# Patient Record
Sex: FEMALE | Race: WHITE | NOT HISPANIC OR LATINO | Employment: FULL TIME | URBAN - METROPOLITAN AREA
[De-identification: names, ages, dates, MRNs, and addresses within clinical notes are randomized per-mention and may not be internally consistent; named-entity substitution may affect disease eponyms.]

---

## 2019-10-12 ENCOUNTER — HOSPITAL ENCOUNTER (EMERGENCY)
Facility: HOSPITAL | Age: 24
Discharge: HOME | End: 2019-10-12
Attending: EMERGENCY MEDICINE
Payer: COMMERCIAL

## 2019-10-12 ENCOUNTER — APPOINTMENT (EMERGENCY)
Dept: RADIOLOGY | Facility: HOSPITAL | Age: 24
End: 2019-10-12
Payer: COMMERCIAL

## 2019-10-12 VITALS
HEIGHT: 67 IN | DIASTOLIC BLOOD PRESSURE: 78 MMHG | OXYGEN SATURATION: 97 % | SYSTOLIC BLOOD PRESSURE: 135 MMHG | RESPIRATION RATE: 18 BRPM | HEART RATE: 86 BPM | TEMPERATURE: 98.2 F | BODY MASS INDEX: 45.99 KG/M2 | WEIGHT: 293 LBS

## 2019-10-12 DIAGNOSIS — J45.901 ASTHMA EXACERBATION, MILD: ICD-10-CM

## 2019-10-12 DIAGNOSIS — J01.90 ACUTE SINUSITIS: Primary | ICD-10-CM

## 2019-10-12 LAB
EXT PREG TEST URINE: NEGATIVE
EXT. CONTROL ED NAV: NORMAL

## 2019-10-12 PROCEDURE — 99284 EMERGENCY DEPT VISIT MOD MDM: CPT

## 2019-10-12 PROCEDURE — 99285 EMERGENCY DEPT VISIT HI MDM: CPT | Performed by: EMERGENCY MEDICINE

## 2019-10-12 PROCEDURE — 94640 AIRWAY INHALATION TREATMENT: CPT

## 2019-10-12 PROCEDURE — 71046 X-RAY EXAM CHEST 2 VIEWS: CPT

## 2019-10-12 PROCEDURE — 81025 URINE PREGNANCY TEST: CPT | Performed by: EMERGENCY MEDICINE

## 2019-10-12 RX ORDER — ALBUTEROL SULFATE 90 UG/1
1-2 AEROSOL, METERED RESPIRATORY (INHALATION) EVERY 6 HOURS PRN
Qty: 1 INHALER | Refills: 0 | Status: SHIPPED | OUTPATIENT
Start: 2019-10-12

## 2019-10-12 RX ORDER — AMOXICILLIN AND CLAVULANATE POTASSIUM 875; 125 MG/1; MG/1
1 TABLET, FILM COATED ORAL ONCE
Status: COMPLETED | OUTPATIENT
Start: 2019-10-12 | End: 2019-10-12

## 2019-10-12 RX ORDER — AMOXICILLIN AND CLAVULANATE POTASSIUM 875; 125 MG/1; MG/1
1 TABLET, FILM COATED ORAL EVERY 12 HOURS SCHEDULED
Qty: 14 TABLET | Refills: 0 | Status: SHIPPED | OUTPATIENT
Start: 2019-10-12 | End: 2019-10-19

## 2019-10-12 RX ORDER — ALBUTEROL SULFATE 90 UG/1
2 AEROSOL, METERED RESPIRATORY (INHALATION) EVERY 6 HOURS PRN
COMMUNITY

## 2019-10-12 RX ORDER — ALBUTEROL SULFATE 90 UG/1
2 AEROSOL, METERED RESPIRATORY (INHALATION) ONCE
Status: COMPLETED | OUTPATIENT
Start: 2019-10-12 | End: 2019-10-12

## 2019-10-12 RX ORDER — SODIUM CHLORIDE FOR INHALATION 0.9 %
VIAL, NEBULIZER (ML) INHALATION
Status: COMPLETED
Start: 2019-10-12 | End: 2019-10-12

## 2019-10-12 RX ADMIN — ALBUTEROL SULFATE 5 MG: 2.5 SOLUTION RESPIRATORY (INHALATION) at 20:44

## 2019-10-12 RX ADMIN — AMOXICILLIN AND CLAVULANATE POTASSIUM 1 TABLET: 875; 125 TABLET, FILM COATED ORAL at 21:50

## 2019-10-12 RX ADMIN — ALBUTEROL SULFATE 2 PUFF: 90 AEROSOL, METERED RESPIRATORY (INHALATION) at 21:50

## 2019-10-12 RX ADMIN — IPRATROPIUM BROMIDE 0.5 MG: 0.5 SOLUTION RESPIRATORY (INHALATION) at 20:44

## 2019-10-12 RX ADMIN — ISODIUM CHLORIDE 3 ML: 0.03 SOLUTION RESPIRATORY (INHALATION) at 20:44

## 2019-10-13 NOTE — ED PROVIDER NOTES
History  Chief Complaint   Patient presents with    Cough     Pt  was dx with bronchitis last Sunday, patient given steroids but finished yesterday  Patient reports increasing coughing with SOB and CP today (CP while coughing)  Patient reports productive cough with clear and yellow mucous  Denies fevers  30year old female with history of asthma presents for evaluation of cough and shortness of breath worsening over the past two weeks  Cough is productive of yellow sputum  Patient had been diagnosed with bronchitis 1 week ago at an urgent care and was prescribed a course of steroids which she completed  She had been using her albuterol inhaler up to twice daily, but noticed that she was not receiving a full dose with the actuations today and switched to her mother's albuterol inhaler  Despite this, she has had minimal relief in symptoms  Patient reports nasal congestion, facial pressure and post nasal drip as well for the past 2 weeks with onset of sore throat over the past week  She has associated chest tightness and wheezing  Patient denies fevers or chills  History provided by:  Patient  URI   Presenting symptoms: congestion, cough, facial pain and sore throat (anterior, sore)    Presenting symptoms: no fever and no rhinorrhea    Congestion:     Location:  Nasal and chest    Interferes with sleep: no      Interferes with eating/drinking: no    Severity:  Moderate  Onset quality:  Gradual  Duration:  2 weeks  Timing:  Constant  Progression:  Worsening  Chronicity:  New  Relieved by:  Nothing  Worsened by:  Nothing  Ineffective treatments: albuterol, prednisone  Associated symptoms: sinus pain (pressure)    Associated symptoms: no headaches, no myalgias and no neck pain    Risk factors: chronic respiratory disease (asthma)        Prior to Admission Medications   Prescriptions Last Dose Informant Patient Reported? Taking?    albuterol (PROVENTIL HFA,VENTOLIN HFA) 90 mcg/act inhaler  Self Yes Yes   Sig: Inhale 2 puffs every 6 (six) hours as needed for wheezing      Facility-Administered Medications: None       Past Medical History:   Diagnosis Date    Psychiatric disorder        Past Surgical History:   Procedure Laterality Date    WISDOM TOOTH EXTRACTION         History reviewed  No pertinent family history  I have reviewed and agree with the history as documented  Social History     Tobacco Use    Smoking status: Former Smoker     Packs/day: 0 00    Smokeless tobacco: Former User   Substance Use Topics    Alcohol use: No    Drug use: No        Review of Systems   Constitutional: Negative for appetite change, chills and fever  HENT: Positive for congestion, postnasal drip, sinus pain (pressure) and sore throat (anterior, sore)  Negative for rhinorrhea  Respiratory: Positive for cough, chest tightness and shortness of breath  Cardiovascular: Negative for chest pain, palpitations and leg swelling  Gastrointestinal: Negative for abdominal pain, constipation, diarrhea, nausea and vomiting  Genitourinary: Negative for dysuria, frequency and hematuria  Musculoskeletal: Negative for myalgias, neck pain and neck stiffness  Skin: Negative for pallor  Neurological: Negative for syncope, weakness and headaches  All other systems reviewed and are negative  Physical Exam  Physical Exam   Constitutional: She is oriented to person, place, and time  She appears well-developed and well-nourished  Non-toxic appearance  No distress  HENT:   Head: Normocephalic and atraumatic  Mouth/Throat: Uvula is midline and oropharynx is clear and moist    Eyes: Pupils are equal, round, and reactive to light  Conjunctivae and EOM are normal    Neck: Normal range of motion  Neck supple  No tracheal deviation present  No thyromegaly present  Cardiovascular: Normal rate, regular rhythm, normal heart sounds and intact distal pulses     Pulmonary/Chest: Effort normal  She has wheezes in the right lower field and the left lower field  She has rales in the right lower field and the left lower field  Abdominal: Soft  Bowel sounds are normal  She exhibits no distension  There is no tenderness  Lymphadenopathy:     She has no cervical adenopathy  Neurological: She is alert and oriented to person, place, and time  Skin: Skin is warm and dry  She is not diaphoretic  Nursing note and vitals reviewed        Vital Signs  ED Triage Vitals   Temperature Pulse Respirations Blood Pressure SpO2   10/12/19 2029 10/12/19 2027 10/12/19 2027 10/12/19 2027 10/12/19 2027   98 2 °F (36 8 °C) 88 18 142/82 99 %      Temp Source Heart Rate Source Patient Position - Orthostatic VS BP Location FiO2 (%)   10/12/19 2029 10/12/19 2027 10/12/19 2027 10/12/19 2027 --   Oral Monitor Sitting Right arm       Pain Score       10/12/19 2027       7           Vitals:    10/12/19 2027   BP: 142/82   Pulse: 88   Patient Position - Orthostatic VS: Sitting         Visual Acuity  Visual Acuity      Most Recent Value   L Pupil Size (mm)  4   R Pupil Size (mm)  4          ED Medications  Medications   albuterol (PROVENTIL HFA,VENTOLIN HFA) inhaler 2 puff (has no administration in time range)   amoxicillin-clavulanate (AUGMENTIN) 875-125 mg per tablet 1 tablet (has no administration in time range)   ipratropium (ATROVENT) 0 02 % inhalation solution 0 5 mg (0 5 mg Nebulization Given 10/12/19 2044)   albuterol inhalation solution 5 mg (5 mg Nebulization Given 10/12/19 2044)   sodium chloride 0 9 % inhalation solution **ADS Override Pull** (3 mL  Given 10/12/19 2044)       Diagnostic Studies  Results Reviewed     Procedure Component Value Units Date/Time    POCT pregnancy, urine [[id number]]  (Normal) Resulted:  10/12/19 2053    Lab Status:  Final result Updated:  10/12/19 2053     EXT PREG TEST UR (Ref: Negative) negative     Control valid                 XR chest 2 views   ED Interpretation by Kareem Carrillo MD (10/12 2131)   No acute pulmonary pathology Procedures  Procedures       ED Course                               MDM  Number of Diagnoses or Management Options  Acute sinusitis: new and requires workup  Asthma exacerbation, mild: new and requires workup  Diagnosis management comments: 30year old female presents with 2 weeks of URI symptoms  No improvement with prednisone and albuterol for presumed bronchitis  Postnasal drip with facial pressure suspicious for sinusitis  Given duration of symptoms, will start antibiotics  PCP follow up  Amount and/or Complexity of Data Reviewed  Tests in the radiology section of CPT®: ordered  Independent visualization of images, tracings, or specimens: yes    Patient Progress  Patient progress: stable      Disposition  Final diagnoses:   Acute sinusitis   Asthma exacerbation, mild     Time reflects when diagnosis was documented in both MDM as applicable and the Disposition within this note     Time User Action Codes Description Comment    10/12/2019  9:31 PM Nell DIXON Add [J01 90] Acute sinusitis     10/12/2019  9:32 PM Nalini Archibald Add [J45 901] Asthma exacerbation, mild       ED Disposition     ED Disposition Condition Date/Time Comment    Discharge Stable Sat Oct 12, 2019  9:31 PM [facility] discharge to home/self care              Follow-up Information     Follow up With Specialties Details Why Contact Info Additional Information    Mckenzie Vance MD  Schedule an appointment as soon as possible for a visit in 1 week for re-evaluation if symptoms are not improving Raissa 30 [address] [facility] Emergency Department Emergency Medicine Go to  If symptoms worsen [address] [facility]  [address] [place] [address] [address] [address] [address], [facility], [address] [facility], [address]          Patient's Medications   Discharge Prescriptions    ALBUTEROL (PROVENTIL HFA,VENTOLIN HFA) 90 MCG/ACT INHALER    Inhale 1-2 puffs every 6 (six) hours as needed for wheezing or shortness of breath       Start Date: 10/12/2019End Date: --       Order Dose: 1-2 puffs       Quantity: 1 Inhaler    Refills: 0    AMOXICILLIN-CLAVULANATE (AUGMENTIN) 875-125 MG PER TABLET    Take 1 tablet by mouth every 12 (twelve) hours for 7 days       Start Date: 10/12/2019End Date: 10/19/2019       Order Dose: 1 tablet       Quantity: 14 tablet    Refills: 0     No discharge procedures on file      ED Provider  Electronically Signed by           Arturo Lucio MD  10/12/19 3381       Arturo Lucio MD  10/12/19 5911

## 2019-10-13 NOTE — ED NOTES
Inspected patient's Albuterol MDI because patient stated it was broken  MDI  10/2018 and there is no propellant in canister either  Patient was educated on medication expiration dates and how to check MDI function and status        Evan Rg RN  10/12/19 7034

## 2019-10-13 NOTE — DISCHARGE INSTRUCTIONS
Rhinosinusitis   WHAT YOU NEED TO KNOW:   Rhinosinusitis (RS) is inflammation of your nose and sinuses  It commonly begins as a virus, often as a common cold  Viruses usually last 7 to 10 days and do not need treatment  When the virus does not get better on its own, you may have bacterial RS  This means that bacteria have begun to grow inside your sinuses  Acute RS lasts less than 4 weeks  Chronic RS lasts 12 weeks or more  Recurrent RS is when you have 4 or more episodes of RS in one year  DISCHARGE INSTRUCTIONS:   Return to the emergency department if:   · Your eye and eyelid are red, swollen, and painful  · You cannot open your eye  · You have double vision or you cannot see  · Your eyeball bulges out or you cannot move your eye  · You are more sleepy than normal or you notice changes in your ability to think, move, or talk  · You have a stiff neck, a fever, or a bad headache  · You have swelling of your forehead or scalp  Contact your healthcare provider if:   · Your symptoms are worse or do not improve after 3 to 5 days of treatment  · You have questions or concerns about your condition or care  Medicines: You may need any of the following:  · Acetaminophen  decreases pain and fever  It is available without a doctor's order  Ask how much to take and how often to take it  Follow directions  Acetaminophen can cause liver damage if not taken correctly  · NSAIDs , such as ibuprofen, help decrease swelling, pain, and fever  This medicine is available with or without a doctor's order  NSAIDs can cause stomach bleeding or kidney problems in certain people  If you take blood thinner medicine, always ask your healthcare provider if NSAIDs are safe for you  Always read the medicine label and follow directions  · Nasal steroid sprays  decrease inflammation in your nose and sinuses  · Decongestants  reduce swelling and drain mucus in the nose and sinuses   They may help you breathe easier  · Antihistamines  dry mucus in the nose and relieve sneezing  · Antibiotics  treat a bacterial infection and may be needed if your symptoms do not improve or they get worse  · Take your medicine as directed  Contact your healthcare provider if you think your medicine is not helping or if you have side effects  Tell him or her if you are allergic to any medicine  Keep a list of the medicines, vitamins, and herbs you take  Include the amounts, and when and why you take them  Bring the list or the pill bottles to follow-up visits  Carry your medicine list with you in case of an emergency  Self-care:   · Rinse your sinuses  Use a sinus rinse device to rinse your nasal passages with a saline (salt water) solution  This will help thin the mucus in your nose and rinse away pollen and dirt  It will also help reduce swelling so you can breathe normally  Ask your healthcare provider how often to do this  · Breathe in steam   Heat a bowl of water until you see steam  Lean over the bowl and make a tent over your head with a large towel  Breathe deeply for about 20 minutes  Be careful not to get too close to the steam or burn yourself  Do this 3 times a day  You can also breathe deeply when you take a hot shower  · Sleep with your head elevated  Place an extra pillow under your head before you go to sleep to help your sinuses drain  · Drink liquids as directed  Ask your healthcare provider how much liquid to drink each day and which liquids are best for you  Liquids will thin the mucus in your nose and help it drain  Avoid drinks that contain alcohol or caffeine  · Do not smoke, and avoid secondhand smoke  Nicotine and other chemicals in cigarettes and cigars can make your symptoms worse  Ask your healthcare provider for information if you currently smoke and need help to quit  E-cigarettes or smokeless tobacco still contain nicotine   Talk to your healthcare provider before you use these products  Follow up with your healthcare provider as directed: Follow up if your symptoms are worse or not better after 3 to 5 days of treatment  Write down your questions so you remember to ask them during your visits  © 2017 [address] [employer] Information is for End User's use only and may not be sold, redistributed or otherwise used for commercial purposes  All illustrations and images included in CareNotes® are the copyrighted property of A D A M , Inc  or Matthew Robert  The above information is an  only  It is not intended as medical advice for individual conditions or treatments  Talk to your doctor, nurse or pharmacist before following any medical regimen to see if it is safe and effective for you

## 2023-02-21 ENCOUNTER — OFFICE VISIT (OUTPATIENT)
Dept: FAMILY MEDICINE CLINIC | Facility: CLINIC | Age: 28
End: 2023-02-21

## 2023-02-21 VITALS
HEIGHT: 67 IN | TEMPERATURE: 97.6 F | BODY MASS INDEX: 45.99 KG/M2 | SYSTOLIC BLOOD PRESSURE: 132 MMHG | OXYGEN SATURATION: 99 % | HEART RATE: 72 BPM | WEIGHT: 293 LBS | DIASTOLIC BLOOD PRESSURE: 82 MMHG

## 2023-02-21 DIAGNOSIS — Z11.4 SCREENING FOR HIV (HUMAN IMMUNODEFICIENCY VIRUS): ICD-10-CM

## 2023-02-21 DIAGNOSIS — K21.9 GASTROESOPHAGEAL REFLUX DISEASE, UNSPECIFIED WHETHER ESOPHAGITIS PRESENT: ICD-10-CM

## 2023-02-21 DIAGNOSIS — Z13.1 SCREENING FOR DIABETES MELLITUS: ICD-10-CM

## 2023-02-21 DIAGNOSIS — R39.15 URINARY URGENCY: ICD-10-CM

## 2023-02-21 DIAGNOSIS — Z00.00 ANNUAL PHYSICAL EXAM: Primary | ICD-10-CM

## 2023-02-21 DIAGNOSIS — Z13.29 SCREENING FOR THYROID DISORDER: ICD-10-CM

## 2023-02-21 DIAGNOSIS — J45.20 MILD INTERMITTENT ASTHMA WITHOUT COMPLICATION: ICD-10-CM

## 2023-02-21 DIAGNOSIS — Z11.59 NEED FOR HEPATITIS C SCREENING TEST: ICD-10-CM

## 2023-02-21 DIAGNOSIS — Z13.6 SCREENING FOR CARDIOVASCULAR CONDITION: ICD-10-CM

## 2023-02-21 LAB
SL AMB  POCT GLUCOSE, UA: NEGATIVE
SL AMB LEUKOCYTE ESTERASE,UA: NEGATIVE
SL AMB POCT BILIRUBIN,UA: NEGATIVE
SL AMB POCT BLOOD,UA: NEGATIVE
SL AMB POCT CLARITY,UA: CLEAR
SL AMB POCT COLOR,UA: YELLOW
SL AMB POCT KETONES,UA: NEGATIVE
SL AMB POCT NITRITE,UA: NEGATIVE
SL AMB POCT PH,UA: 6.5
SL AMB POCT SPECIFIC GRAVITY,UA: 1.01
SL AMB POCT URINE PROTEIN: NEGATIVE
SL AMB POCT UROBILINOGEN: NORMAL

## 2023-02-21 RX ORDER — ALBUTEROL SULFATE 90 UG/1
2 AEROSOL, METERED RESPIRATORY (INHALATION) EVERY 4 HOURS PRN
Qty: 18 G | Refills: 0 | Status: SHIPPED | OUTPATIENT
Start: 2023-02-21 | End: 2023-02-23 | Stop reason: SDUPTHER

## 2023-02-21 RX ORDER — ALBUTEROL SULFATE 90 UG/1
1-2 AEROSOL, METERED RESPIRATORY (INHALATION) EVERY 4 HOURS PRN
Qty: 18 G | Refills: 0 | Status: SHIPPED | OUTPATIENT
Start: 2023-02-21 | End: 2023-02-21

## 2023-02-21 RX ORDER — OMEPRAZOLE 20 MG/1
20 CAPSULE, DELAYED RELEASE ORAL
Qty: 30 CAPSULE | Refills: 5 | Status: SHIPPED | OUTPATIENT
Start: 2023-02-21

## 2023-02-21 NOTE — PROGRESS NOTES
ADULT ANNUAL [place] [facility]    NAME: Leny Ndiaye  AGE: 32 y o  SEX: female  : 1995     DATE: 2023     Assessment and Plan:     Problem List Items Addressed This Visit        Digestive    GERD (gastroesophageal reflux disease)    Relevant Medications    omeprazole (PriLOSEC) 20 mg delayed release capsule  Start omeprazole 20 mg once daily  We discussed lifestyle changes including weight loss, eating smaller more frequent meals, avoiding caffeine, nicotine and spicy foods  Respiratory    Mild intermittent asthma without complication    Relevant Medications    albuterol (PROVENTIL HFA,VENTOLIN HFA) 90 mcg/act inhaler  Stable  She has albuterol inhaler at home but it is    rx sent to pharmacy        Other    BMI 45 0-49 9, adult [facility])    Relevant Orders    Ambulatory Referral to Weight Management     Other Visit Diagnoses     Annual physical exam    -  Primary  Discussed diet and exercise  Overdue for cervical cancer screening  She was given contact information for [facility]  She has not had screening labs done in awhile which I ordered today  She is agreeable to having hep C and HIV screening done         Asthma exacerbation, mild        Relevant Medications    albuterol (PROVENTIL HFA,VENTOLIN HFA) 90 mcg/act inhaler    Screening for cardiovascular condition        Relevant Orders    Lipid panel    Screening for diabetes mellitus        Relevant Orders    Comprehensive metabolic panel    Screening for thyroid disorder        Relevant Orders    TSH, 3rd generation with Free T4 reflex    Need for hepatitis C screening test        Relevant Orders    Hepatitis C Antibody ([facility], [facility])    Screening for HIV (human immunodeficiency virus)        Relevant Orders    HIV 1/2 Antigen/Antibody (Fourth Generation) with Reflex Testing ([facility], [facility], or [facility]TER[facility])    Urinary urgency        Relevant Orders POCT urine dip    Ambulatory Referral to Urogynecology  Urinalysis in office today was normal    ? Overactive bladder  Refer urogyn          Immunizations and preventive care screenings were discussed with patient today  Appropriate education was printed on patient's after visit summary  Counseling:  Alcohol/drug use: discussed moderation in alcohol intake, the recommendations for healthy alcohol use, and avoidance of illicit drug use  Dental Health: discussed importance of regular tooth brushing, flossing, and dental visits  Sexual health: discussed sexually transmitted diseases, partner selection, use of condoms, avoidance of unintended pregnancy, and contraceptive alternatives  Exercise: the importance of regular exercise/physical activity was discussed  Recommend exercise 3-5 times per week for at least 30 minutes  BMI Counseling: Body mass index is 48 62 kg/m²  The BMI is above normal  Nutrition recommendations include decreasing portion sizes, encouraging healthy choices of fruits and vegetables, limiting drinks that contain sugar, moderation in carbohydrate intake, increasing intake of lean protein and reducing intake of saturated and trans fat  Exercise recommendations include moderate physical activity 150 minutes/week  No pharmacotherapy was ordered  Patient referred to weight management  Rationale for BMI follow-up plan is due to patient being overweight or obese  Depression Screening and Follow-up Plan: Patient was screened for depression during today's encounter  They screened negative with a PHQ-2 score of 0  No follow-ups on file  Chief Complaint:     Chief Complaint   Patient presents with   • Establish Care     Weight discussion  No recent labs  Last PE in august 2022      History of Present Illness:     Adult Annual Physical   Patient here for a comprehensive physical exam    Previous PCP= [facility]? She has not been seen there in quite a few years     No previous records for review  Cervical cancer screening done around 2018  Gyn was Dr Brie Goodrich  No recent labs  Has received flu vaccine this season  Up to date on her adacel  She has not received the covid vaccine  She declines right now  No chronic active medical problems  Concerns today as noted below  1  Discuss weight   Has done Boris Goldstein, Michael Carbajal, [employer], [employer], juicing  Was most successful with [employer]  States that she is successful at first and then seems to plateau  2  Has reflux most days  Thinks it is her weight  Takes tums and pepcid with relief  3  Has urinary urgency  Ongoing for the last year  Some leakage  No stress incontinence  No dysuria  Does admit to frquency  No nocturia  Diet and Physical Activity  Diet/Nutrition: well balanced  does not usuall y eat breakfast, for lunch will have grapefruit, banana and peanut butter, protein bar  Dinner varies  Sometimes has pasta  More fast food recently than she typically dose  No sweetened beverages  Drinks diet or black tea  Exercise: home workout videos at least 3 times per week  used to run         Depression Screening  PHQ-2/9 Depression Screening    Little interest or pleasure in doing things: 0 - not at all  Feeling down, depressed, or hopeless: 0 - not at all  PHQ-2 Score: 0  PHQ-2 Interpretation: Negative depression screen       General Health  Sleep: sleeps well  Falls asleep okay but wakes up at night then is tired in AM  Does not snore  Hearing: normal - bilateral   Vision: wears glasses  Dental: regular dental visits  /GYN Health  Last menstrual period: 2/2/23  Menses are regular  Contraceptive method: barrier methods  is sexually active           Review of Systems:     Review of Systems   Past Medical History:     Past Medical History:   Diagnosis Date   • Anxiety 10/01/2021    ed visit for anxiety in 2021 and also in 2016; counselors off and on through the years   • Asthma       Past Surgical History:     Past Surgical History:   Procedure Laterality Date   • WISDOM TOOTH EXTRACTION        Social History:     Social History     Socioeconomic History   • Marital status: Single     Spouse name: None   • Number of children: None   • Years of education: None   • Highest education level: None   Occupational History   • None   Tobacco Use   • Smoking status: Former     Packs/day: 0 25     Years: 5 00     Pack years: 1 25     Types: Cigarettes     Quit date: 2022     Years since quittin 0   • Smokeless tobacco: Former     Types: Chew     Quit date: 2022   • Tobacco comments:     Chewed for 6-7 years   Vaping Use   • Vaping Use: Never used   Substance and Sexual Activity   • Alcohol use: No   • Drug use: No   • Sexual activity: Yes     Partners: Male   Other Topics Concern   • None   Social History Narrative    Single    One daughter who is 4     She works as  for [employer] in [place] Strain: Not on file   Food Insecurity: Not on file   Transportation Needs: Not on file   Physical Activity: Not on file   Stress: Not on file   Social Connections: Not on file   Intimate Partner Violence: Not on file   Housing Stability: Not on file      Family History:     Family History   Problem Relation Age of Onset   • Obesity Father       Current Medications:     Current Outpatient Medications   Medication Sig Dispense Refill   • albuterol (PROVENTIL HFA,VENTOLIN HFA) 90 mcg/act inhaler Inhale 2 puffs every 4 (four) hours as needed for wheezing 18 g 0   • omeprazole (PriLOSEC) 20 mg delayed release capsule Take 1 capsule (20 mg total) by mouth daily before breakfast 30 capsule 5     No current facility-administered medications for this visit  Allergies:      Allergies   Allergen Reactions   • Pollen Extract Hives      Physical Exam:     /82 (BP Location: Left arm, Patient Position: Sitting, Cuff Size: Standard)   Pulse 72   Temp 97 6 °F (36 4 °C) (Tympanic)   Ht 5' 7" (1 702 m)   Wt (!) 141 kg (310 lb 6 4 oz)   SpO2 99%   BMI 48 62 kg/m²     Physical Exam  Constitutional:       General: She is not in acute distress  Appearance: Normal appearance  She is obese  She is not ill-appearing, toxic-appearing or diaphoretic  HENT:      Head: Normocephalic and atraumatic  Right Ear: Tympanic membrane normal       Left Ear: Tympanic membrane normal       Nose: Nose normal       Mouth/Throat:      Mouth: Mucous membranes are moist       Pharynx: No oropharyngeal exudate or posterior oropharyngeal erythema  Eyes:      Extraocular Movements: Extraocular movements intact  Conjunctiva/sclera: Conjunctivae normal       Pupils: Pupils are equal, round, and reactive to light  Neck:      Comments: No thyromegaly  Cardiovascular:      Rate and Rhythm: Normal rate and regular rhythm  Heart sounds: No murmur heard  Pulmonary:      Effort: Pulmonary effort is normal       Breath sounds: Normal breath sounds  Abdominal:      General: Abdomen is flat  Bowel sounds are normal  There is no distension  Palpations: There is no mass  Tenderness: There is no abdominal tenderness  Musculoskeletal:         General: Normal range of motion  Cervical back: Normal range of motion and neck supple  Right lower leg: No edema  Left lower leg: No edema  Lymphadenopathy:      Cervical: No cervical adenopathy  Skin:     General: Skin is warm and dry  Findings: No rash  Neurological:      General: No focal deficit present  Mental Status: She is alert and oriented to person, place, and time        Deep Tendon Reflexes: Reflexes normal    Psychiatric:         Mood and Affect: Mood normal           Bhaskar Chong DO   [address] PRIMARY CARE

## 2023-02-22 ENCOUNTER — TELEPHONE (OUTPATIENT)
Dept: OBGYN CLINIC | Facility: CLINIC | Age: 28
End: 2023-02-22

## 2023-02-22 NOTE — TELEPHONE ENCOUNTER
2/22/23-Pt to have last pap and progress note faxed from [facility] prior to NP appt.

## 2023-02-23 ENCOUNTER — NURSE TRIAGE (OUTPATIENT)
Dept: OTHER | Facility: OTHER | Age: 28
End: 2023-02-23

## 2023-02-23 DIAGNOSIS — J45.20 MILD INTERMITTENT ASTHMA WITHOUT COMPLICATION: ICD-10-CM

## 2023-02-23 RX ORDER — ALBUTEROL SULFATE 90 UG/1
2 AEROSOL, METERED RESPIRATORY (INHALATION) EVERY 4 HOURS PRN
Qty: 18 G | Refills: 0 | Status: SHIPPED | OUTPATIENT
Start: 2023-02-23

## 2023-02-23 NOTE — TELEPHONE ENCOUNTER
Reason for Disposition  • Caller has medicine question, adult has minor symptoms, caller declines triage, AND triager answers question    Answer Assessment - Initial Assessment Questions  1  NAME of MEDICATION: "What medicine are you calling about?"      Albuterol   2  QUESTION: "What is your question?" (e g , medication refill, side effect)      "The pharmacy stated they never received it "  3  PRESCRIBING HCP: "Who prescribed it?" Reason: if prescribed by specialist, call should be referred to that group        PCP    Protocols used: MEDICATION QUESTION CALL-ADULT-

## 2023-02-23 NOTE — TELEPHONE ENCOUNTER
Regarding: Medication dispensing issues  ----- Message from Kyalee Burkett sent at 2/23/2023  6:08 PM EST -----  "I went to  my prescription for Albuterol and the pharmacist told me that my doctor canceled this order   I am not sure why "

## 2023-02-24 NOTE — TELEPHONE ENCOUNTER
According to my records, it looks like the albuterol rx was sent to [facility] on 2/21/23 but looks like health call nurse re-sent on 2/23/23  Should be at pharmacy

## 2023-02-28 LAB
ALBUMIN SERPL-MCNC: 4.6 G/DL (ref 3.9–5)
ALBUMIN/GLOB SERPL: 1.8 {RATIO} (ref 1.2–2.2)
ALP SERPL-CCNC: 91 IU/L (ref 44–121)
ALT SERPL-CCNC: 18 IU/L (ref 0–32)
AST SERPL-CCNC: 18 IU/L (ref 0–40)
BILIRUB SERPL-MCNC: 0.4 MG/DL (ref 0–1.2)
BUN SERPL-MCNC: 9 MG/DL (ref 6–20)
BUN/CREAT SERPL: 16 (ref 9–23)
CALCIUM SERPL-MCNC: 9.2 MG/DL (ref 8.7–10.2)
CHLORIDE SERPL-SCNC: 103 MMOL/L (ref 96–106)
CHOLEST SERPL-MCNC: 142 MG/DL (ref 100–199)
CHOLEST/HDLC SERPL: 2.6 RATIO (ref 0–4.4)
CO2 SERPL-SCNC: 22 MMOL/L (ref 20–29)
CREAT SERPL-MCNC: 0.56 MG/DL (ref 0.57–1)
EGFR: 127 ML/MIN/1.73
GLOBULIN SER-MCNC: 2.6 G/DL (ref 1.5–4.5)
GLUCOSE SERPL-MCNC: 83 MG/DL (ref 70–99)
HDLC SERPL-MCNC: 55 MG/DL
LDLC SERPL CALC-MCNC: 76 MG/DL (ref 0–99)
POTASSIUM SERPL-SCNC: 4.1 MMOL/L (ref 3.5–5.2)
PROT SERPL-MCNC: 7.2 G/DL (ref 6–8.5)
SL AMB VLDL CHOLESTEROL CALC: 11 MG/DL (ref 5–40)
SODIUM SERPL-SCNC: 139 MMOL/L (ref 134–144)
TRIGL SERPL-MCNC: 52 MG/DL (ref 0–149)
TSH SERPL DL<=0.005 MIU/L-ACNC: 1.24 UIU/ML (ref 0.45–4.5)

## 2023-03-08 ENCOUNTER — TELEPHONE (OUTPATIENT)
Dept: FAMILY MEDICINE CLINIC | Facility: CLINIC | Age: 28
End: 2023-03-08

## 2023-03-08 NOTE — TELEPHONE ENCOUNTER
[facility] CALLS IN FOR RECENT OV NOTE AND LABS FOR PATIENT TO BE FAXED AS PATIENT HAS OV WITH THEM SOON ,   SENT TO [phone]

## 2023-06-06 ENCOUNTER — APPOINTMENT (OUTPATIENT)
Dept: RADIOLOGY | Facility: HOSPITAL | Age: 28
End: 2023-06-06
Payer: COMMERCIAL

## 2023-06-06 ENCOUNTER — HOSPITAL ENCOUNTER (EMERGENCY)
Facility: HOSPITAL | Age: 28
Discharge: HOME | End: 2023-06-06
Attending: EMERGENCY MEDICINE
Payer: COMMERCIAL

## 2023-06-06 VITALS
SYSTOLIC BLOOD PRESSURE: 142 MMHG | TEMPERATURE: 99.2 F | OXYGEN SATURATION: 98 % | BODY MASS INDEX: 44.73 KG/M2 | WEIGHT: 285 LBS | DIASTOLIC BLOOD PRESSURE: 88 MMHG | RESPIRATION RATE: 18 BRPM | HEART RATE: 80 BPM | HEIGHT: 67 IN

## 2023-06-06 DIAGNOSIS — S93.401A RIGHT ANKLE SPRAIN: Primary | ICD-10-CM

## 2023-06-06 PROCEDURE — 73610 X-RAY EXAM OF ANKLE: CPT

## 2023-06-06 RX ORDER — IBUPROFEN 600 MG/1
600 TABLET ORAL ONCE
Status: COMPLETED | OUTPATIENT
Start: 2023-06-06 | End: 2023-06-06

## 2023-06-06 RX ADMIN — IBUPROFEN 600 MG: 600 TABLET ORAL at 20:19

## 2023-06-06 NOTE — ED PROVIDER NOTES
"History  Chief Complaint   Patient presents with   • Ankle Injury     Patient reports to ED after tripping and \"hearing so many cracks in my ankle, foot and leg\"  Patient reports catching her fall and not hitting the ground  Patient reports shin and top of foot is numb  Patient has not attempted to bear weight and can only wiggle toes  Patient can feel palpation on PV exam       This is a 49-year-old female states that she tripped and fell twisting her right ankle a few hours prior to arrival   Denies any other areas of significant trauma or injury  Did hear a snap during the injury and has crutches from home      History provided by:  Patient  Medical Problem  Location:  Right ankle  Quality:  Pain and swelling  Severity:  Moderate  Onset quality:  Sudden  Timing:  Constant  Progression:  Waxing and waning  Chronicity:  New  Context:  Right ankle injury after tripping incident  Worsened by:  Palpation and movement      Prior to Admission Medications   Prescriptions Last Dose Informant Patient Reported? Taking? albuterol (PROVENTIL HFA,VENTOLIN HFA) 90 mcg/act inhaler   No No   Sig: Inhale 2 puffs every 4 (four) hours as needed for wheezing   omeprazole (PriLOSEC) 20 mg delayed release capsule   No No   Sig: Take 1 capsule (20 mg total) by mouth daily before breakfast      Facility-Administered Medications: None       Past Medical History:   Diagnosis Date   • Anxiety 10/01/2021    ed visit for anxiety in 2021 and also in 2016; counselors off and on through the years   • Asthma        Past Surgical History:   Procedure Laterality Date   • WISDOM TOOTH EXTRACTION         Family History   Problem Relation Age of Onset   • Obesity Father      I have reviewed and agree with the history as documented      E-Cigarette/Vaping   • E-Cigarette Use Never User      E-Cigarette/Vaping Substances     Social History     Tobacco Use   • Smoking status: Former     Packs/day: 0 25     Years: 5 00     Total pack years: 1 25     " Types: Cigarettes     Quit date: 2022     Years since quittin 3   • Smokeless tobacco: Former     Types: Chew     Quit date: 2022   • Tobacco comments:     Chewed for 6-7 years   Vaping Use   • Vaping Use: Never used   Substance Use Topics   • Alcohol use: No   • Drug use: No       Review of Systems   Musculoskeletal:        Right ankle pain   All other systems reviewed and are negative  Physical Exam  Physical Exam  Vitals and nursing note reviewed  Constitutional:       General: She is not in acute distress  Appearance: She is not ill-appearing, toxic-appearing or diaphoretic  HENT:      Head: Normocephalic and atraumatic  Right Ear: External ear normal       Left Ear: External ear normal    Eyes:      General: No scleral icterus  Right eye: No discharge  Left eye: No discharge  Extraocular Movements: Extraocular movements intact  Pupils: Pupils are equal, round, and reactive to light  Cardiovascular:      Rate and Rhythm: Normal rate and regular rhythm  Pulses: Normal pulses  Heart sounds: No murmur heard  No friction rub  No gallop  Pulmonary:      Effort: Pulmonary effort is normal  No respiratory distress  Breath sounds: No stridor  No wheezing, rhonchi or rales  Abdominal:      General: There is no distension  Palpations: Abdomen is soft  Tenderness: There is no abdominal tenderness  There is no guarding or rebound  Musculoskeletal:         General: Swelling and tenderness present  No deformity  Cervical back: Normal range of motion and neck supple  No rigidity or tenderness  Right lower leg: Edema present  Comments: Moderate swelling and tenderness over the right lateral malleolus pulses and gross sensation to touch are normal   Decreased movement of the ankle secondary to pain   Skin:     General: Skin is warm and dry  Coloration: Skin is not jaundiced        Findings: No bruising, erythema or rash    Neurological:      Mental Status: She is alert and oriented to person, place, and time  Cranial Nerves: No cranial nerve deficit  Sensory: No sensory deficit  Motor: No weakness  Coordination: Coordination normal    Psychiatric:         Mood and Affect: Mood normal          Behavior: Behavior normal          Vital Signs  ED Triage Vitals [06/06/23 1854]   Temperature Pulse Respirations Blood Pressure SpO2   99 2 °F (37 3 °C) 80 18 142/88 98 %      Temp Source Heart Rate Source Patient Position - Orthostatic VS BP Location FiO2 (%)   Temporal Monitor -- -- --      Pain Score       4           Vitals:    06/06/23 1854   BP: 142/88   Pulse: 80         Visual Acuity      ED Medications  Medications   ibuprofen (MOTRIN) tablet 600 mg (has no administration in time range)       Diagnostic Studies  Results Reviewed     None                 XR ankle 3+ views RIGHT   ED Interpretation by Ricardo Martin DO (06/06 1958)   No acute fracture or dislocation                 Procedures  Procedures         ED Course                               SBIRT 22yo+    Flowsheet Row Most Recent Value   Initial Alcohol Screen: US AUDIT-C     1  How often do you have a drink containing alcohol? 0 Filed at: 06/06/2023 1853   2  How many drinks containing alcohol do you have on a typical day you are drinking? 0 Filed at: 06/06/2023 1853   3a  Male UNDER 65: How often do you have five or more drinks on one occasion? 0 Filed at: 06/06/2023 1853   3b  FEMALE Any Age, or MALE 65+: How often do you have 4 or more drinks on one occassion? 0 Filed at: 06/06/2023 1853   Audit-C Score 0 Filed at: 06/06/2023 1853   MATIAS: How many times in the past year have you    Used an illegal drug or used a prescription medication for non-medical reasons?  Never Filed at: 06/06/2023 1853                    Medical Decision Making  Right ankle injury we will check x-rays rule out fracture dislocation    Amount and/or Complexity of Data Reviewed  Radiology: ordered and independent interpretation performed  Risk  Prescription drug management  Disposition  Final diagnoses:   Right ankle sprain     Time reflects when diagnosis was documented in both MDM as applicable and the Disposition within this note     Time User Action Codes Description Comment    6/6/2023  8:05 PM Tam Simons [W10 054I] Right ankle sprain       ED Disposition     ED Disposition   Discharge    Condition   Stable    Date/Time   Tue Jun 6, 2023  8:04 PM    [address] discharge to home/self care  Follow-up Information     Follow up With Specialties Details Why Contact Info Additional [address] [facility] Orthopedic Surgery In 1 week As needed, If symptoms worsen [address] [phone] [address] [facility], [address] [place] [address], [phone]          Patient's Medications   Discharge Prescriptions    No medications on file       No discharge procedures on file      PDMP Review     None          ED Provider  Electronically Signed by           Joseph Lindsay DO  06/06/23 2009

## 2023-06-08 PROBLEM — Z12.4 SCREENING FOR CERVICAL CANCER: Status: ACTIVE | Noted: 2023-06-08

## 2023-06-08 PROBLEM — Z30.09 CONTRACEPTIVE EDUCATION: Status: ACTIVE | Noted: 2023-06-08

## 2023-06-08 PROBLEM — Z01.419 ENCOUNTER FOR GYNECOLOGICAL EXAMINATION WITHOUT ABNORMAL FINDING: Status: ACTIVE | Noted: 2023-06-08

## 2023-06-08 NOTE — PATIENT INSTRUCTIONS
Pap every  3-5 years if normal, sexually transmitted infection testing as indicated, exercise most days of week, obtain appropriate diet and hydration, Calcium 1000mg + 600 vit D daily, birth control as directed (ACHES reviewed)  Benefits, risks and alternatives discussed/reviewed  HPV 9 vaccine recommended through age 39  Check with your insurance for coverage  If covered, call office to schedule start of vaccine series  Annual mammogram starting at age 36, monthly breast self exam  Elise José 20 times twice daily

## 2023-06-08 NOTE — PROGRESS NOTES
[address], [address], [facility] [address]    ASSESSMENT/PLAN: Jabier Romano is a 32 y o  Wade Tan who presents for annual gynecologic exam     Encounter for routine gynecologic examination  - Routine well woman exam completed today  - HPV Vaccination status: Not immunized  Counseling done   - STI screening offered including HIV testing: desires  Culture    And  Lab slip   - Contraceptive counseling discussed  Current contraception: none or condoms   :-Pap smear   No hx of abn       Additional problems addressed during this visit:  1  Encounter for gynecological examination with abnormal finding    2  Contraceptive education  Comments:  R+B of  MARISOL< POP, Nuvaring,  Depoand Larcs   would like prog  IUD  Call day one of period  eat and take 2 motrin one hour prior     3  Screening for cervical cancer  -     IGP, CtNg, rfx Aptima HPV ASCU    4  BMI 45 0-49 9, adult ([facility] [address] )  Comments:  encouraged wt loss and exercise    5  Screen for STD (sexually transmitted disease)  -     IGP, CtNg, rfx Aptima HPV ASCU  -     Hepatitis panel, acute; Future  -     RPR, Rfx Qn RPR/Confirm TP; Future  -     HIV 1/2 AG/AB W REFLEX [facility] only; Future  -     Hepatitis panel, acute  -     RPR, Rfx Qn RPR/Confirm TP  -     HIV 1/2 AG/AB W REFLEX [facility] only    6  HPV vaccine counseling  Comments:  #1  gardasil given today fu in 2 mo  and  6 mo   Orders:  -     HPV VACCINE 9 VALENT IM        CC:  Annual Gynecologic Examination    HPI: Jabier Romano is a 32 y o  Wade Tan who presents for annual gynecologic examination  [address] here for  Wellness exam   Menarche  12   28-30 d  For  7 d   3-4  Heavy  Wearing tampons  Chg  Every  2-3 hours  ,  Cramps  + since daughter  Born  Uses  Tylenol or motrin  No gardasil vaccine   BCM in the past   Ring , OCP   currently nothing  Not currently desires a child     +  In wt loss program  And has lost 20 lbs         The following portions of the patient's history were reviewed and updated as appropriate: She  has a past medical history of Anxiety (10/01/2021) and Asthma  She  has a past surgical history that includes [place] tooth extraction  Her family history includes Asthma in her maternal grandmother; Cancer in her mother and sister; Cervical cancer in her mother; Obesity in her father; Throat cancer in her paternal uncle  She  reports that she quit smoking about 16 months ago  Her smoking use included cigarettes  She quit smokeless tobacco use about 16 months ago  Her smokeless tobacco use included chew  She reports that she does not drink alcohol and does not use drugs  Current Outpatient Medications   Medication Sig Dispense Refill   • albuterol (PROVENTIL HFA,VENTOLIN HFA) 90 mcg/act inhaler Inhale 2 puffs every 4 (four) hours as needed for wheezing 18 g 0   • omeprazole (PriLOSEC) 20 mg delayed release capsule Take 1 capsule (20 mg total) by mouth daily before breakfast 30 capsule 5   • polyethylene glycol (MIRALAX) 17 g packet Take 17 g by mouth daily     • Wegovy 1 7 MG/0 75ML INJECT 1 7MG SUBCUTANEOUSLY EVERY WEEK FOR WEIGHT LOSS       No current facility-administered medications for this visit  She is allergic to pollen extract       Review of Systems   Constitutional: Negative for chills and fever  HENT: Negative for ear pain and sore throat  Eyes: Negative for pain and visual disturbance  Respiratory: Negative for cough and shortness of breath  Cardiovascular: Negative for chest pain and palpitations  Gastrointestinal: Negative for abdominal pain and vomiting  Genitourinary: Negative for dysuria and hematuria  Musculoskeletal: Negative for arthralgias and back pain  Skin: Negative for color change and rash  Neurological: Negative for seizures and syncope  Hematological: Negative  Psychiatric/Behavioral: Negative  All other systems reviewed and are negative          Objective:  /80 (BP "Location: Left arm, Patient Position: Sitting, Cuff Size: Large)   Ht 5' 7\" (1 702 m)   Wt 132 kg (291 lb 12 8 oz)   LMP 06/05/2023   BMI 45 70 kg/m²    Physical Exam  Vitals and nursing note reviewed  Constitutional:       Appearance: Normal appearance  She is obese  HENT:      Head: Normocephalic  Cardiovascular:      Rate and Rhythm: Normal rate and regular rhythm  Pulses: Normal pulses  Heart sounds: Normal heart sounds  Pulmonary:      Effort: Pulmonary effort is normal       Breath sounds: Normal breath sounds  Chest:      Chest wall: No mass, lacerations, swelling, tenderness or edema  Breasts: Lincoln Score is 4  Breasts are symmetrical       Right: Normal  No swelling, bleeding, inverted nipple, mass, nipple discharge, skin change or tenderness  Left: No swelling, bleeding, inverted nipple, mass, nipple discharge, skin change or tenderness  Abdominal:      General: Abdomen is flat  Bowel sounds are normal       Palpations: Abdomen is soft  Genitourinary:     General: Normal vulva  Exam position: Lithotomy position  Pubic Area: No rash  Lincoln stage (genital): 4       Labia:         Right: No rash, tenderness or lesion  Left: No rash, tenderness or lesion  Urethra: No urethral pain, urethral swelling or urethral lesion  Vagina: Normal       Cervix: No cervical motion tenderness or discharge  Uterus: Normal        Adnexa: Right adnexa normal and left adnexa normal       Rectum: Normal    Musculoskeletal:         General: Normal range of motion  Cervical back: Neck supple  Lymphadenopathy:      Upper Body:      Right upper body: No supraclavicular, axillary or pectoral adenopathy  Left upper body: No supraclavicular, axillary or pectoral adenopathy  Lower Body: No right inguinal adenopathy  No left inguinal adenopathy  Skin:     General: Skin is warm and dry     Neurological:      General: No focal deficit " present  Mental Status: She is alert and oriented to person, place, and time  Psychiatric:         Mood and Affect: Mood normal          Behavior: Behavior normal          Thought Content:  Thought content normal          Judgment: Judgment normal

## 2023-06-09 ENCOUNTER — OFFICE VISIT (OUTPATIENT)
Dept: OBGYN CLINIC | Facility: CLINIC | Age: 28
End: 2023-06-09
Payer: COMMERCIAL

## 2023-06-09 VITALS
DIASTOLIC BLOOD PRESSURE: 80 MMHG | HEIGHT: 67 IN | SYSTOLIC BLOOD PRESSURE: 118 MMHG | WEIGHT: 291.8 LBS | BODY MASS INDEX: 45.8 KG/M2

## 2023-06-09 DIAGNOSIS — Z71.85 HPV VACCINE COUNSELING: ICD-10-CM

## 2023-06-09 DIAGNOSIS — Z30.09 CONTRACEPTIVE EDUCATION: ICD-10-CM

## 2023-06-09 DIAGNOSIS — Z12.4 SCREENING FOR CERVICAL CANCER: ICD-10-CM

## 2023-06-09 DIAGNOSIS — Z11.3 SCREEN FOR STD (SEXUALLY TRANSMITTED DISEASE): ICD-10-CM

## 2023-06-09 DIAGNOSIS — Z01.411 ENCOUNTER FOR GYNECOLOGICAL EXAMINATION WITH ABNORMAL FINDING: Primary | ICD-10-CM

## 2023-06-09 PROCEDURE — 99385 PREV VISIT NEW AGE 18-39: CPT | Performed by: OBSTETRICS & GYNECOLOGY

## 2023-06-09 PROCEDURE — 90651 9VHPV VACCINE 2/3 DOSE IM: CPT

## 2023-06-09 PROCEDURE — 90471 IMMUNIZATION ADMIN: CPT

## 2023-06-09 RX ORDER — SEMAGLUTIDE 1.7 MG/.75ML
INJECTION, SOLUTION SUBCUTANEOUS
COMMUNITY
Start: 2023-06-05

## 2023-06-09 RX ORDER — POLYETHYLENE GLYCOL 3350 17 G/17G
17 POWDER, FOR SOLUTION ORAL DAILY
COMMUNITY

## 2023-06-13 LAB
C TRACH RRNA CVX QL NAA+PROBE: NEGATIVE
CYTOLOGIST CVX/VAG CYTO: NORMAL
DX ICD CODE: NORMAL
N GONORRHOEA RRNA CVX QL NAA+PROBE: NEGATIVE
OTHER STN SPEC: NORMAL
OTHER STN SPEC: NORMAL
PATH REPORT.FINAL DX SPEC: NORMAL
SL AMB NOTE:: NORMAL
SL AMB SPECIMEN ADEQUACY: NORMAL
SL AMB TEST METHODOLOGY: NORMAL

## 2023-06-28 DIAGNOSIS — K21.9 GASTROESOPHAGEAL REFLUX DISEASE, UNSPECIFIED WHETHER ESOPHAGITIS PRESENT: ICD-10-CM

## 2023-06-28 RX ORDER — OMEPRAZOLE 20 MG/1
CAPSULE, DELAYED RELEASE ORAL
Qty: 90 CAPSULE | Refills: 2 | Status: SHIPPED | OUTPATIENT
Start: 2023-06-28

## 2023-08-07 PROBLEM — Z12.4 SCREENING FOR CERVICAL CANCER: Status: RESOLVED | Noted: 2023-06-08 | Resolved: 2023-08-07

## 2023-08-08 PROBLEM — Z11.3 SCREEN FOR STD (SEXUALLY TRANSMITTED DISEASE): Status: RESOLVED | Noted: 2023-06-09 | Resolved: 2023-08-08

## 2023-09-01 ENCOUNTER — TELEPHONE (OUTPATIENT)
Dept: FAMILY MEDICINE CLINIC | Facility: CLINIC | Age: 28
End: 2023-09-01

## 2023-09-01 NOTE — TELEPHONE ENCOUNTER
Appointment -    Patient was scheduled for problem visit with Dr. Kinjal Yun / PCP on 9/1/2023. Patient missed appointment. LVM advising patient to call the office to reschedule.

## 2023-09-14 ENCOUNTER — OFFICE VISIT (OUTPATIENT)
Dept: FAMILY MEDICINE CLINIC | Facility: CLINIC | Age: 28
End: 2023-09-14
Payer: COMMERCIAL

## 2023-09-14 VITALS
WEIGHT: 272 LBS | SYSTOLIC BLOOD PRESSURE: 100 MMHG | TEMPERATURE: 99.3 F | HEIGHT: 67 IN | HEART RATE: 87 BPM | RESPIRATION RATE: 18 BRPM | OXYGEN SATURATION: 100 % | BODY MASS INDEX: 42.69 KG/M2 | DIASTOLIC BLOOD PRESSURE: 60 MMHG

## 2023-09-14 DIAGNOSIS — K14.3 HYPERTROPHY OF TONGUE PAPILLAE: Primary | ICD-10-CM

## 2023-09-14 PROBLEM — Z01.419 ENCOUNTER FOR GYNECOLOGICAL EXAMINATION WITHOUT ABNORMAL FINDING: Status: RESOLVED | Noted: 2023-06-08 | Resolved: 2023-09-14

## 2023-09-14 PROCEDURE — 99213 OFFICE O/P EST LOW 20 MIN: CPT | Performed by: FAMILY MEDICINE

## 2023-09-14 NOTE — PROGRESS NOTES
Name: Mitch Yin      : 1995      MRN: [id number]  Encounter Provider: Kylie Aldrich DO  Encounter Date: 2023   Encounter department: [address] [place] [address]     1. Hypertrophy of tongue papillae  -     Ambulatory Referral to Oral Maxillofacial Surgery; Future  Unclear as to whether these papilla are pathological.   Will have her see oral surgery for evaluation and management in the event that they may need biopsied. Subjective     HPI   Patient is a 25year old female with GERD, asthma here today for evaluation of "bumps" under tongue. Started noticing them 3 months ago. Some come and go. A few have not resolved. No pain, irritation associated with them. On wegovy prescribed by an online physician/company who partners with weight watchers since April and she is wondering if could be related ?      Review of Systems    Past Medical History:   Diagnosis Date   • Anxiety 10/01/2021    ed visit for anxiety in  and also in ; counselors off and on through the years   • Asthma    • Encounter for gynecological examination without abnormal finding 2023     Past Surgical History:   Procedure Laterality Date   • WISDOM TOOTH EXTRACTION       Family History   Problem Relation Age of Onset   • Cancer Mother    • Cervical cancer Mother         hpv   • Obesity Father    • Cancer Sister         eye   • Asthma Maternal Grandmother    • Throat cancer Paternal Uncle    • Colon cancer Neg Hx    • Ovarian cancer Neg Hx      Social History     Socioeconomic History   • Marital status: Single     Spouse name: None   • Number of children: None   • Years of education: None   • Highest education level: None   Occupational History   • None   Tobacco Use   • Smoking status: Former     Packs/day: 0.00     Years: 0.00     Total pack years: 0.00     Types: Cigarettes     Quit date: 2022     Years since quittin.6   • Smokeless tobacco: Former Types: Devante Alcala     Quit date: 2/1/2022   • Tobacco comments:     Chewed for 6-7 years   Vaping Use   • Vaping Use: Never used   Substance and Sexual Activity   • Alcohol use: No   • Drug use: No   • Sexual activity: Yes     Partners: Male   Other Topics Concern   • None   Social History Narrative    Single    One daughter who is 4     She works as  for [employer] in [employer] Strain: Not on file   Food Insecurity: Not on file   Transportation Needs: Not on file   Physical Activity: Not on file   Stress: Not on file   Social Connections: Not on file   Intimate Partner Violence: Not on file   Housing Stability: Not on file     Current Outpatient Medications on File Prior to Visit   Medication Sig   • albuterol (PROVENTIL HFA,VENTOLIN HFA) 90 mcg/act inhaler Inhale 2 puffs every 4 (four) hours as needed for wheezing   • Wegovy 1.7 MG/0.75ML INJECT 1.7MG SUBCUTANEOUSLY EVERY WEEK FOR WEIGHT LOSS   • [DISCONTINUED] omeprazole (PriLOSEC) 20 mg delayed release capsule TAKE 1 CAPSULE BY MOUTH DAILY BEFORE BREAKFAST. (Patient not taking: Reported on 9/14/2023)   • [DISCONTINUED] polyethylene glycol (MIRALAX) 17 g packet Take 17 g by mouth daily (Patient not taking: Reported on 9/14/2023)     Allergies   Allergen Reactions   • Pollen Extract Hives     Immunization History   Administered Date(s) Administered   • HPV9 06/09/2023   • INFLUENZA 09/21/2022   • Tdap 09/12/2018       Objective     /60 (BP Location: Left arm, Patient Position: Sitting, Cuff Size: Large)   Pulse 87   Temp 99.3 °F (37.4 °C) (Tympanic)   Resp 18   Ht 5' 7" (1.702 m)   Wt 123 kg (272 lb)   SpO2 100%   BMI 42.60 kg/m²         Physical Exam  Vitals and nursing note reviewed. Constitutional:       General: She is not in acute distress. Appearance: Normal appearance. She is not ill-appearing, toxic-appearing or diaphoretic.    HENT:      Head: Normocephalic and atraumatic. Right Ear: Tympanic membrane normal.      Left Ear: Tympanic membrane normal.      Mouth/Throat:      Mouth: Mucous membranes are moist.      Comments: Erythematous papules under tongue. No papules on floor of mouth. No other oral lesions  Eyes:      Conjunctiva/sclera: Conjunctivae normal.   Cardiovascular:      Rate and Rhythm: Normal rate and regular rhythm. Musculoskeletal:      Cervical back: Normal range of motion and neck supple. Lymphadenopathy:      Cervical: No cervical adenopathy. Neurological:      Mental Status: She is alert.    Psychiatric:         Mood and Affect: Mood normal.       Kylie Aldrich DO

## 2024-05-06 ENCOUNTER — PATIENT MESSAGE (OUTPATIENT)
Dept: FAMILY MEDICINE CLINIC | Facility: CLINIC | Age: 29
End: 2024-05-06

## 2024-05-07 NOTE — PATIENT COMMUNICATION
Please contact patient to schedule her physical and discuss medication management with zepbound.

## 2024-05-08 ENCOUNTER — OFFICE VISIT (OUTPATIENT)
Dept: FAMILY MEDICINE CLINIC | Facility: CLINIC | Age: 29
End: 2024-05-08
Payer: COMMERCIAL

## 2024-05-08 VITALS
WEIGHT: 244 LBS | DIASTOLIC BLOOD PRESSURE: 80 MMHG | SYSTOLIC BLOOD PRESSURE: 130 MMHG | RESPIRATION RATE: 18 BRPM | TEMPERATURE: 99 F | HEIGHT: 67 IN | BODY MASS INDEX: 38.3 KG/M2 | OXYGEN SATURATION: 100 % | HEART RATE: 71 BPM

## 2024-05-08 DIAGNOSIS — Z13.29 SCREENING FOR THYROID DISORDER: ICD-10-CM

## 2024-05-08 DIAGNOSIS — R39.15 URINARY URGENCY: ICD-10-CM

## 2024-05-08 DIAGNOSIS — Z13.6 SCREENING FOR CARDIOVASCULAR CONDITION: ICD-10-CM

## 2024-05-08 DIAGNOSIS — Z13.1 SCREENING FOR DIABETES MELLITUS: ICD-10-CM

## 2024-05-08 DIAGNOSIS — Z13.0 SCREENING FOR DEFICIENCY ANEMIA: ICD-10-CM

## 2024-05-08 LAB
SL AMB  POCT GLUCOSE, UA: NEGATIVE
SL AMB LEUKOCYTE ESTERASE,UA: NEGATIVE
SL AMB POCT BILIRUBIN,UA: NEGATIVE
SL AMB POCT BLOOD,UA: NEGATIVE
SL AMB POCT CLARITY,UA: CLEAR
SL AMB POCT COLOR,UA: YELLOW
SL AMB POCT KETONES,UA: NEGATIVE
SL AMB POCT NITRITE,UA: NEGATIVE
SL AMB POCT PH,UA: 7
SL AMB POCT SPECIFIC GRAVITY,UA: 1.02
SL AMB POCT URINE PROTEIN: NEGATIVE
SL AMB POCT UROBILINOGEN: NORMAL

## 2024-05-08 PROCEDURE — 99214 OFFICE O/P EST MOD 30 MIN: CPT | Performed by: FAMILY MEDICINE

## 2024-05-08 PROCEDURE — 81002 URINALYSIS NONAUTO W/O SCOPE: CPT | Performed by: FAMILY MEDICINE

## 2024-05-08 RX ORDER — TIRZEPATIDE 10 MG/.5ML
10 INJECTION, SOLUTION SUBCUTANEOUS WEEKLY
COMMUNITY
Start: 2024-04-24 | End: 2024-05-08

## 2024-05-08 RX ORDER — TIRZEPATIDE 12.5 MG/.5ML
12.5 INJECTION, SOLUTION SUBCUTANEOUS WEEKLY
Qty: 2 ML | Refills: 0 | Status: SHIPPED | OUTPATIENT
Start: 2024-05-08

## 2024-05-08 NOTE — PROGRESS NOTES
Name: Leny Ndiaye      : 1995      MRN: [id number]  Encounter Provider: Joellen Delacruz DO  Encounter Date: 2024   Encounter department: [facility] PRIMARY CARE    Assessment & Plan     1. BMI 38.0-38.9,adult  Patient doing great on GLP1. Has lost 67 lbs since starting. Initially on wegovy and recently transitioned over to zepbound. Has received 2 of the 10 mg doses thus far.   Will complete 2 more doses of the 10 mg dose then titrate up to the 12.5 mg dose   Patient denies personal and family history of MCT and MEN2 tumors. Patient denies personal history of pancreatitis   She will continue efforts at diet and exercise as she has been.   Followup in 5 weeks.   2. Screening for diabetes mellitus  -     Comprehensive metabolic panel    3. Screening for thyroid disorder  -     TSH, 3rd generation with Free T4 reflex    4. Screening for deficiency anemia  -     CBC and differential    5. Screening for cardiovascular condition  -     Lipid panel    6. Urinary urgency  -     POCT urine dip  UA in office today was negative/normal.   ?overactive bladder.   Recommend kegels and avoiding bladder irritants.     Screening labs ordered to be done prior to her physical exam in about 5 weeks.          Subjective     HPI  Patient is a 28 yea rold female with asthma and obesity with BMI of 38.22 and weight of 244  who is being seen today for medication management.     She is currently seeing an online provider through a telehealth service called [facility] for her obesity and is being prescribed zepbound. Is on 10 mg weekly. Due to cost, wishes to transition her care to this office so that [employer] covers the medication. She had been on wegovy at its highest does prior to being changed over to zepbound. Was constipated on wegovy. No side effects on the zepbound.   Her weight prior to starting zepbound was 311.   She was 310 lbs with bmi of 48 when I saw her last in 2023.   She is  running for exercise. Just completed a half marathon and did walk/run.   Eating clean.     She is also complaining of some urinary issues off and on. Has urgency and some urge incontinence. No frequency. Nocturia x 1. No dysuria.     Has some nasal congestion and post nasal drip that causes her to cough. Started just a couple of days ago. Taking zyrtec. Asthma has been okay.     Her last PE was 23. She sees gyn for paps and last one was in  of last year.       Review of Systems    Past Medical History:   Diagnosis Date   • Anxiety 10/01/2021    ed visit for anxiety in  and also in 2016; counselors off and on through the years   • Asthma    • Encounter for gynecological examination without abnormal finding 2023     Past Surgical History:   Procedure Laterality Date   • WISDOM TOOTH EXTRACTION       Family History   Problem Relation Age of Onset   • Cancer Mother    • Cervical cancer Mother         hpv   • Obesity Father    • Cancer Sister         eye   • Asthma Maternal Grandmother    • Throat cancer Paternal Uncle    • Colon cancer Neg Hx    • Ovarian cancer Neg Hx      Social History     Socioeconomic History   • Marital status: Single     Spouse name: None   • Number of children: None   • Years of education: None   • Highest education level: None   Occupational History   • None   Tobacco Use   • Smoking status: Former     Current packs/day: 0.00     Types: Cigarettes     Quit date: 2022     Years since quittin.2   • Smokeless tobacco: Former     Types: Chew     Quit date: 2022   • Tobacco comments:     Chewed for 6-7 years   Vaping Use   • Vaping status: Never Used   Substance and Sexual Activity   • Alcohol use: No   • Drug use: No   • Sexual activity: Yes     Partners: Male   Other Topics Concern   • None   Social History Narrative    Single    One daughter who is 4     She works as  for [employer] in [place]     Social Determinants of Health  "    Financial Resource Strain: Not on file   Food Insecurity: Not on file   Transportation Needs: Not on file   Physical Activity: Not on file   Stress: Not on file   Social Connections: Not on file   Intimate Partner Violence: Not on file   Housing Stability: Not on file     Current Outpatient Medications on File Prior to Visit   Medication Sig   • albuterol (PROVENTIL HFA,VENTOLIN HFA) 90 mcg/act inhaler Inhale 2 puffs every 4 (four) hours as needed for wheezing   • [DISCONTINUED] Zepbound 10 MG/0.5ML auto-injector Inject 10 mg under the skin once a week   • [DISCONTINUED] Wegovy 1.7 MG/0.75ML INJECT 1.7MG SUBCUTANEOUSLY EVERY WEEK FOR WEIGHT LOSS (Patient not taking: Reported on 5/8/2024)     Allergies   Allergen Reactions   • Pollen Extract Hives     Immunization History   Administered Date(s) Administered   • HPV9 06/09/2023   • INFLUENZA 09/21/2022   • Tdap 09/12/2018       Objective     /80 (BP Location: Left arm, Patient Position: Sitting, Cuff Size: Standard)   Pulse 71   Temp 99 °F (37.2 °C) (Tympanic)   Resp 18   Ht 5' 7\" (1.702 m)   Wt 111 kg (244 lb)   LMP 04/17/2024   SpO2 100%   BMI 38.22 kg/m²     Physical Exam  Vitals and nursing note reviewed.   Constitutional:       General: She is not in acute distress.     Appearance: Normal appearance. She is obese. She is not ill-appearing, toxic-appearing or diaphoretic.   HENT:      Head: Normocephalic.      Right Ear: Tympanic membrane normal.      Left Ear: Tympanic membrane normal.      Nose: Nose normal.      Mouth/Throat:      Mouth: Mucous membranes are moist.      Pharynx: No posterior oropharyngeal erythema.   Eyes:      Extraocular Movements: Extraocular movements intact.      Conjunctiva/sclera: Conjunctivae normal.      Pupils: Pupils are equal, round, and reactive to light.   Cardiovascular:      Rate and Rhythm: Normal rate and regular rhythm.      Heart sounds: No murmur heard.  Pulmonary:      Effort: Pulmonary effort is normal. "      Breath sounds: Normal breath sounds.   Abdominal:      General: Abdomen is flat. Bowel sounds are normal.      Palpations: Abdomen is soft.   Musculoskeletal:      Cervical back: Normal range of motion and neck supple.      Right lower leg: No edema.      Left lower leg: No edema.   Lymphadenopathy:      Cervical: No cervical adenopathy.   Skin:     Findings: Rash present.   Neurological:      Mental Status: She is alert.   Psychiatric:         Mood and Affect: Mood normal.     Joellen Delacruz, DO

## 2024-05-08 NOTE — PATIENT INSTRUCTIONS
Continue the zepbound 10 mg weekly for 2 more injections and then increase to the 12.5 mg dose.   Get labs done as ordered and return for your physical exam in about 5-6 weeks.

## 2024-06-11 ENCOUNTER — TELEPHONE (OUTPATIENT)
Age: 29
End: 2024-06-11

## 2024-06-11 RX ORDER — TIRZEPATIDE 15 MG/.5ML
15 INJECTION, SOLUTION SUBCUTANEOUS WEEKLY
Qty: 2 ML | Refills: 0 | Status: SHIPPED | OUTPATIENT
Start: 2024-06-11

## 2024-06-11 NOTE — TELEPHONE ENCOUNTER
Patient is currently on: tirzepatide (Zepbound) 12.5 mg/0.5 mL auto-injector     She was scheduled for her physical today but it is her daughters first soccer game and patient doesn't want to be late or miss it so she rescheduled to 7/12.    She states that on Thursday she is going to need to go up to the next dosage of Zepbound and is asking for that to be refilled.    Please advise and call patient.

## 2024-07-11 NOTE — PROGRESS NOTES
Adult Annual Physical  Name: Leny Ndiaye      : 1995      MRN: [id number]  Encounter Provider: Joellen Delacruz DO  Encounter Date: 2024   Encounter department: [facility] PRIMARY CARE    Assessment & Plan   1. Annual physical exam  Discussed diet and exercise  Pap up to date  Adacel up to date  Screening labs ordered at last visit and patient reminded to do them. Agreeable to hep c and HIV screens  2. Medication management  On zepbound and tolerating well. She is up to 12.5 mg dose and would like to increase to 15 mg. Some difficulty finding in pharmacy.   She is down 69 lbs since start of GLP1 RA (was initially on wegovy and transitioned over to zepbound as prescribed by outside provider)   Will continue   F/u  2 months.   3. BMI 38.0-38.9,adult  4. Need for hepatitis C screening test  -     Hepatitis C Antibody; Future  5. Screening for HIV (human immunodeficiency virus)  -     HIV 1/2 AG/AB w Reflex [facility] for 2 yr old and above; Future  Immunizations and preventive care screenings were discussed with patient today. Appropriate education was printed on patient's after visit summary.    Counseling:  Alcohol/drug use: discussed moderation in alcohol intake, the recommendations for healthy alcohol use, and avoidance of illicit drug use.  Dental Health: discussed importance of regular tooth brushing, flossing, and dental visits.  Injury prevention: discussed safety/seat belts, safety helmets, smoke detectors, carbon dioxide detectors, and smoking near bedding or upholstery.  Sexual health: discussed sexually transmitted diseases, partner selection, use of condoms, avoidance of unintended pregnancy, and contraceptive alternatives.  Exercise: the importance of regular exercise/physical activity was discussed. Recommend exercise 3-5 times per week for at least 30 minutes.          History of Present Illness     Adult Annual Physical:  Patient presents for annual physical.     Diet and  "Physical Activity:  - Diet/Nutrition: well balanced diet and consuming 3-5 servings of fruits/vegetables daily. no milk in diet  - Exercise:. running regularly. half marathon in november    Depression Screening:  - PHQ-2 Score: 0    Patient is a 29 year old female with mild intermittent asthma, obesity being seen today for annual physical exam and for medication management of her zepbound.   She was last seen here in office on 5/8/24 at which time she was doing great on GLP1. Had lost 67 lbs since starting. (Initially on wegovy and recently transitioned over to zepbound and had received 2 of the 10 mg doses thus far.)  Was instructed to complete 2 more doses of the 10 mg dose then titrate up to the 12.5 mg dose. Has rx for 15 mg dose but has not picked up yet. Felt a little achey when she went from 10 to 12.5 mg for first few days after injection. She is paying out of pocket for medication.     Today, weight is down to 242 lbs, down from 244 in May.   Continues to run for exercise. Doing half marathon in November.     Her pap is up to date (6/29/23)  Has rx for screening labs that were given in May.   Adacel up to date (9/12/18)    Review of Systems  Medical History Reviewed by provider this encounter:  Tobacco  Allergies  Meds  Problems  Med Hx  Surg Hx  Fam Hx  Soc   Hx      Current Outpatient Medications on File Prior to Visit   Medication Sig Dispense Refill   • albuterol (PROVENTIL HFA,VENTOLIN HFA) 90 mcg/act inhaler Inhale 2 puffs every 4 (four) hours as needed for wheezing 18 g 0   • tirzepatide (Zepbound) 15 mg/0.5 mL auto-injector Inject 0.5 mL (15 mg total) under the skin once a week 2 mL 0     No current facility-administered medications on file prior to visit.        Objective     /78   Pulse 73   Temp 97.5 °F (36.4 °C)   Resp 18   Ht 5' 7\" (1.702 m)   Wt 110 kg (242 lb 9.6 oz)   SpO2 100%   BMI 38.00 kg/m²     Physical Exam  Vitals and nursing note reviewed.   Constitutional:       " General: She is not in acute distress.     Appearance: Normal appearance. She is not ill-appearing, toxic-appearing or diaphoretic.   HENT:      Head: Normocephalic and atraumatic.      Right Ear: Tympanic membrane normal.      Left Ear: Tympanic membrane normal.      Nose: Nose normal.      Mouth/Throat:      Mouth: Mucous membranes are moist.      Pharynx: No oropharyngeal exudate or posterior oropharyngeal erythema.      Comments: Lips dry  Eyes:      Conjunctiva/sclera: Conjunctivae normal.      Pupils: Pupils are equal, round, and reactive to light.   Cardiovascular:      Rate and Rhythm: Normal rate and regular rhythm.      Heart sounds: No murmur heard.  Pulmonary:      Effort: Pulmonary effort is normal.      Breath sounds: Normal breath sounds.   Abdominal:      General: Abdomen is flat. Bowel sounds are normal.      Palpations: Abdomen is soft.   Musculoskeletal:      Cervical back: Normal range of motion and neck supple.      Right lower leg: No edema.      Left lower leg: No edema.   Lymphadenopathy:      Cervical: No cervical adenopathy.   Skin:     General: Skin is warm and dry.      Findings: No rash.   Neurological:      General: No focal deficit present.      Mental Status: She is alert and oriented to person, place, and time.   Psychiatric:         Mood and Affect: Mood normal.

## 2024-07-11 NOTE — PATIENT INSTRUCTIONS
"Patient Education     Routine physical for adults   The Basics   Written by the doctors and editors at [facility]   What is a physical? -- A physical is a routine visit, or \"check-up,\" with your doctor. You might also hear it called a \"wellness visit\" or \"preventive visit.\"  During each visit, the doctor will:   Ask about your physical and mental health   Ask about your habits, behaviors, and lifestyle   Do an exam   Give you vaccines if needed   Talk to you about any medicines you take   Give advice about your health   Answer your questions  Getting regular check-ups is an important part of taking care of your health. It can help your doctor find and treat any problems you have. But it's also important for preventing health problems.  A routine physical is different from a \"sick visit.\" A sick visit is when you see a doctor because of a health concern or problem. Since physicals are scheduled ahead of time, you can think about what you want to ask the doctor.  How often should I get a physical? -- It depends on your age and health. In general, for people age 21 years and older:   If you are younger than 50 years, you might be able to get a physical every 3 years.   If you are 50 years or older, your doctor might recommend a physical every year.  If you have an ongoing health condition, like diabetes or high blood pressure, your doctor will probably want to see you more often.  What happens during a physical? -- In general, each visit will include:   Physical exam - The doctor or nurse will check your height, weight, heart rate, and blood pressure. They will also look at your eyes and ears. They will ask about how you are feeling and whether you have any symptoms that bother you.   Medicines - It's a good idea to bring a list of all the medicines you take to each doctor visit. Your doctor will talk to you about your medicines and answer any questions. Tell them if you are having any side effects that bother you. You " "should also tell them if you are having trouble paying for any of your medicines.   Habits and behaviors - This includes:   Your diet   Your exercise habits   Whether you smoke, drink alcohol, or use drugs   Whether you are sexually active   Whether you feel safe at home  Your doctor will talk to you about things you can do to improve your health and lower your risk of health problems. They will also offer help and support. For example, if you want to quit smoking, they can give you advice and might prescribe medicines. If you want to improve your diet or get more physical activity, they can help you with this, too.   Lab tests, if needed - The tests you get will depend on your age and situation. For example, your doctor might want to check your:   Cholesterol   Blood sugar   Iron level   Vaccines - The recommended vaccines will depend on your age, health, and what vaccines you already had. Vaccines are very important because they can prevent certain serious or deadly infections.   Discussion of screening - \"Screening\" means checking for diseases or other health problems before they cause symptoms. Your doctor can recommend screening based on your age, risk, and preferences. This might include tests to check for:   Cancer, such as breast, prostate, cervical, ovarian, colorectal, prostate, lung, or skin cancer   Sexually transmitted infections, such as chlamydia and gonorrhea   Mental health conditions like depression and anxiety  Your doctor will talk to you about the different types of screening tests. They can help you decide which screenings to have. They can also explain what the results might mean.   Answering questions - The physical is a good time to ask the doctor or nurse questions about your health. If needed, they can refer you to other doctors or specialists, too.  Adults older than 65 years often need other care, too. As you get older, your doctor will talk to you about:   How to prevent falling at " home   Hearing or vision tests   Memory testing   How to take your medicines safely   Making sure that you have the help and support you need at home  All topics are updated as new evidence becomes available and our peer review process is complete.  This topic retrieved from [employer] on: May 02, 2024.  Topic 074664 Version 1.0  Release: 32.4.3 - C32.122  © 2024 UpToDate, Inc. and/or its affiliates. All rights reserved.  Consumer Information Use and Disclaimer   Disclaimer: This generalized information is a limited summary of diagnosis, treatment, and/or medication information. It is not meant to be comprehensive and should be used as a tool to help the user understand and/or assess potential diagnostic and treatment options. It does NOT include all information about conditions, treatments, medications, side effects, or risks that may apply to a specific patient. It is not intended to be medical advice or a substitute for the medical advice, diagnosis, or treatment of a health care provider based on the health care provider's examination and assessment of a patient's specific and unique circumstances. Patients must speak with a health care provider for complete information about their health, medical questions, and treatment options, including any risks or benefits regarding use of medications. This information does not endorse any treatments or medications as safe, effective, or approved for treating a specific patient. UpToDate, Inc. and its affiliates disclaim any warranty or liability relating to this information or the use thereof.The use of this information is governed by the Terms of Use, available at https://www.woltersResumesimo.comuwer.com/en/know/clinical-effectiveness-terms. 2024© UpToDate, Inc. and its affiliates and/or licensors. All rights reserved.  Copyright   © 2024 UpToDate, Inc. and/or its affiliates. All rights reserved.

## 2024-07-12 ENCOUNTER — OFFICE VISIT (OUTPATIENT)
Dept: FAMILY MEDICINE CLINIC | Facility: CLINIC | Age: 29
End: 2024-07-12
Payer: COMMERCIAL

## 2024-07-12 ENCOUNTER — TELEPHONE (OUTPATIENT)
Age: 29
End: 2024-07-12

## 2024-07-12 VITALS
WEIGHT: 242.6 LBS | HEIGHT: 67 IN | OXYGEN SATURATION: 100 % | BODY MASS INDEX: 38.08 KG/M2 | HEART RATE: 73 BPM | SYSTOLIC BLOOD PRESSURE: 130 MMHG | DIASTOLIC BLOOD PRESSURE: 78 MMHG | TEMPERATURE: 97.5 F | RESPIRATION RATE: 18 BRPM

## 2024-07-12 DIAGNOSIS — Z00.00 ANNUAL PHYSICAL EXAM: Primary | ICD-10-CM

## 2024-07-12 DIAGNOSIS — Z11.59 NEED FOR HEPATITIS C SCREENING TEST: ICD-10-CM

## 2024-07-12 DIAGNOSIS — Z11.4 SCREENING FOR HIV (HUMAN IMMUNODEFICIENCY VIRUS): ICD-10-CM

## 2024-07-12 DIAGNOSIS — Z79.899 MEDICATION MANAGEMENT: ICD-10-CM

## 2024-07-12 PROBLEM — K21.9 GERD (GASTROESOPHAGEAL REFLUX DISEASE): Status: RESOLVED | Noted: 2023-02-21 | Resolved: 2024-07-12

## 2024-07-12 PROCEDURE — 99395 PREV VISIT EST AGE 18-39: CPT | Performed by: FAMILY MEDICINE

## 2024-07-12 NOTE — TELEPHONE ENCOUNTER
Patient called to inform provider her insurance will need prior auth to contain the medical necessary criteria entered at the time of prior auth being submitted for her zepbound rx. Please reach out to the patient if there are any further questions

## 2024-10-18 ENCOUNTER — OFFICE VISIT (OUTPATIENT)
Dept: FAMILY MEDICINE CLINIC | Facility: CLINIC | Age: 29
End: 2024-10-18
Payer: COMMERCIAL

## 2024-10-18 VITALS
DIASTOLIC BLOOD PRESSURE: 90 MMHG | HEART RATE: 68 BPM | WEIGHT: 253.6 LBS | OXYGEN SATURATION: 100 % | SYSTOLIC BLOOD PRESSURE: 128 MMHG | BODY MASS INDEX: 39.8 KG/M2 | HEIGHT: 67 IN

## 2024-10-18 DIAGNOSIS — Z23 ENCOUNTER FOR IMMUNIZATION: ICD-10-CM

## 2024-10-18 DIAGNOSIS — Z79.899 MEDICATION MANAGEMENT: Primary | ICD-10-CM

## 2024-10-18 DIAGNOSIS — E66.812 CLASS 2 OBESITY WITHOUT SERIOUS COMORBIDITY IN ADULT, UNSPECIFIED BMI, UNSPECIFIED OBESITY TYPE: ICD-10-CM

## 2024-10-18 PROCEDURE — 99213 OFFICE O/P EST LOW 20 MIN: CPT | Performed by: FAMILY MEDICINE

## 2024-10-18 PROCEDURE — 90673 RIV3 VACCINE NO PRESERV IM: CPT

## 2024-10-18 PROCEDURE — 90471 IMMUNIZATION ADMIN: CPT

## 2024-10-18 RX ORDER — TIRZEPATIDE 5 MG/.5ML
5 INJECTION, SOLUTION SUBCUTANEOUS WEEKLY
Qty: 2 ML | Refills: 0 | Status: SHIPPED | OUTPATIENT
Start: 2024-10-18

## 2024-10-18 NOTE — PROGRESS NOTES
Ambulatory Visit  Name: Leny Ndiaye      : 1995      MRN: [id number]  Encounter Provider: Joellen Delacruz DO  Encounter Date: 10/18/2024   Encounter department: [facility] PRIMARY CARE    Assessment & Plan  Medication management  Off meds for 2 months. Had been on 15 mg prior to stopping  Since then, gained 10 lbs despite efforts at diet and exercise  She wants to resume meds  Hesitant to start her out at dose she was on when she stopped but in order avoid GI side effects will start her back at 5 mg dose and have her f/u in 1 month to titrate up to 7.5 mg dose.   Patient denies personal and family history of MCT and MEN2 tumors. Patient denies personal history of pancreatitis      Orders:  •  tirzepatide (Zepbound) 5 mg/0.5 mL auto-injector; Inject 0.5 mL (5 mg total) under the skin once a week    Class 2 obesity without serious comorbidity in adult, unspecified BMI, unspecified obesity type      Orders:  •  tirzepatide (Zepbound) 5 mg/0.5 mL auto-injector; Inject 0.5 mL (5 mg total) under the skin once a week    Encounter for immunization    Orders:  •  influenza vaccine, recombinant, PF, 0.5 mL IM (Flublok)         History of Present Illness     HPI  Patient is a 29 year old female with history of asthma, being seen today for f/u on her obesity/medication management.     When I saw her in May of this year, she had been seeing seeing an online provider through a telehealth service called [facility] for her obesity and was being prescribed zepbound 10 mg weekly . Prior to that had been on wegovy at highest dose but did not tolerate due to constipation.   Her weight prior to initiation of zepbound was 311.   I took over prescribing her zepbound in may and dose was increased to 12.55 mg. Her weight at that time was 244 lbs.   In July, titrated up to 15 mg    Since I saw her she has stopped the medication (early August) She wanted to try losing weight on her own. Having a lot of cravings  "and has gained 10 lbs since last OV.     Continues to run for exercise. Doing half marathon in November.       Review of Systems  Past Medical History:   Diagnosis Date   • Anxiety 10/01/2021    ed visit for anxiety in  and also in 2016; counselors off and on through the years   • Asthma    • Encounter for gynecological examination without abnormal finding 2023     Past Surgical History:   Procedure Laterality Date   • WISDOM TOOTH EXTRACTION       Family History   Problem Relation Age of Onset   • Cancer Mother    • Cervical cancer Mother         hpv   • Obesity Father    • Cancer Sister         eye   • Asthma Maternal Grandmother    • Throat cancer Paternal Uncle    • Colon cancer Neg Hx    • Ovarian cancer Neg Hx      Social History     Tobacco Use   • Smoking status: Former     Current packs/day: 0.00     Types: Cigarettes     Quit date: 2022     Years since quittin.7   • Smokeless tobacco: Former     Types: Chew     Quit date: 2022   • Tobacco comments:     Chewed for 6-7 years   Vaping Use   • Vaping status: Never Used   Substance and Sexual Activity   • Alcohol use: No   • Drug use: No   • Sexual activity: Yes     Partners: Male     Current Outpatient Medications on File Prior to Visit   Medication Sig   • albuterol (PROVENTIL HFA,VENTOLIN HFA) 90 mcg/act inhaler Inhale 2 puffs every 4 (four) hours as needed for wheezing   • [DISCONTINUED] tirzepatide (Zepbound) 15 mg/0.5 mL auto-injector Inject 0.5 mL (15 mg total) under the skin once a week (Patient not taking: Reported on 10/18/2024)     Allergies   Allergen Reactions   • Pollen Extract Hives     Immunization History   Administered Date(s) Administered   • HPV9 2023   • INFLUENZA 2022   • Tdap 2018     Objective     /68   Pulse 68   Ht 5' 7\" (1.702 m)   Wt 115 kg (253 lb 9.6 oz)   SpO2 100%   BMI 39.72 kg/m²     Physical Exam  Vitals and nursing note reviewed.   Constitutional:       General: She is not in " acute distress.     Appearance: Normal appearance. She is obese. She is not ill-appearing, toxic-appearing or diaphoretic.   HENT:      Head: Normocephalic and atraumatic.   Eyes:      Conjunctiva/sclera: Conjunctivae normal.   Neck:      Comments: No thyromegaly  Cardiovascular:      Rate and Rhythm: Normal rate and regular rhythm.      Heart sounds: No murmur heard.  Pulmonary:      Effort: Pulmonary effort is normal.      Breath sounds: Normal breath sounds.   Abdominal:      General: Abdomen is flat. Bowel sounds are normal.   Musculoskeletal:      Cervical back: Normal range of motion and neck supple.   Lymphadenopathy:      Cervical: No cervical adenopathy.   Neurological:      General: No focal deficit present.      Mental Status: She is alert and oriented to person, place, and time.   Psychiatric:         Mood and Affect: Mood normal.

## 2024-11-15 ENCOUNTER — OFFICE VISIT (OUTPATIENT)
Dept: FAMILY MEDICINE CLINIC | Facility: CLINIC | Age: 29
End: 2024-11-15
Payer: COMMERCIAL

## 2024-11-15 VITALS
BODY MASS INDEX: 38.06 KG/M2 | DIASTOLIC BLOOD PRESSURE: 63 MMHG | HEART RATE: 77 BPM | WEIGHT: 243 LBS | SYSTOLIC BLOOD PRESSURE: 132 MMHG | OXYGEN SATURATION: 100 %

## 2024-11-15 DIAGNOSIS — E66.812 CLASS 2 OBESITY WITHOUT SERIOUS COMORBIDITY IN ADULT, UNSPECIFIED BMI, UNSPECIFIED OBESITY TYPE: ICD-10-CM

## 2024-11-15 DIAGNOSIS — J45.40 MODERATE PERSISTENT ASTHMA WITHOUT COMPLICATION: ICD-10-CM

## 2024-11-15 DIAGNOSIS — Z79.899 MEDICATION MANAGEMENT: Primary | ICD-10-CM

## 2024-11-15 PROCEDURE — 99214 OFFICE O/P EST MOD 30 MIN: CPT | Performed by: FAMILY MEDICINE

## 2024-11-15 RX ORDER — BUDESONIDE AND FORMOTEROL FUMARATE DIHYDRATE 160; 4.5 UG/1; UG/1
2 AEROSOL RESPIRATORY (INHALATION) 2 TIMES DAILY
Qty: 10.2 G | Refills: 5 | Status: SHIPPED | OUTPATIENT
Start: 2024-11-15 | End: 2024-11-19 | Stop reason: SDUPTHER

## 2024-11-15 RX ORDER — TIRZEPATIDE 7.5 MG/.5ML
7.5 INJECTION, SOLUTION SUBCUTANEOUS WEEKLY
Qty: 2 ML | Refills: 0 | Status: SHIPPED | OUTPATIENT
Start: 2024-11-15

## 2024-11-15 NOTE — ASSESSMENT & PLAN NOTE
Previously only used her rescue inhaler once every couple of months and if sick with URI.   Notes that asthma worsened in severity after half marathon last week.   Had to use rescue inhaler multiple times throughout course of day.     Orders:    budesonide-formoterol (SYMBICORT) 160-4.5 mcg/act inhaler; Inhale 2 puffs 2 (two) times a day Rinse mouth after use.     None

## 2024-11-15 NOTE — PROGRESS NOTES
Name: Leny Ndiaye      : 1995      MRN: [id number]  Encounter Provider: Joellen Delacruz DO  Encounter Date: 11/15/2024   Encounter department: [facility] PRIMARY [facility]ARE    Assessment & Plan  Medication management  Currently on zepbound 5 mg weekly. Doing well and down 10 lbs since her visit last month.   Will titrate up to the 7.5 mg dose.   Orders:  •  tirzepatide (Zepbound) 7.5 mg/0.5 mL auto-injector; Inject 0.5 mL (7.5 mg total) under the skin once a week    Class 2 obesity without serious comorbidity in adult, unspecified BMI, unspecified obesity type      Orders:  •  tirzepatide (Zepbound) 7.5 mg/0.5 mL auto-injector; Inject 0.5 mL (7.5 mg total) under the skin once a week    Moderate persistent asthma without complication  Previously only used her rescue inhaler once every couple of months and if sick with URI.   Notes that asthma worsened in severity after half marathon last week.   Had to use rescue inhaler multiple times throughout course of day.     Orders:  •  budesonide-formoterol (SYMBICORT) 160-4.5 mcg/act inhaler; Inhale 2 puffs 2 (two) times a day Rinse mouth after use.         History of Present Illness     HPIpatient is a 29 year old female who is being seen today for f/u on her zepbound.     Seen on 10/18/24 to discuss resuming her GLP1 RA.   Had been on 15 mg of zepbound and stopped in August.    gained 10 lbs despite efforts at diet and exercise and wanted to resume taking medication. Weight at time of resuming med on 10/18 was 253   in order avoid GI side effects will start her back at 5 mg dose and have her f/u in 1 month to titrate up to 7.5 mg dose. She is doing well on the zepbound and tolerating well. She has lost 10 lbs since last OV.     Her weight prior to initiation of zepbound in spring of this year was 311 (initially prescribed by online provider through company called [employer])    Having trouble with her asthma. Worse since she ran 1/2 marathon last  week.   Wheezing and using rescue inhaler multiple times per day. Wheezing and lots of phlegm. Using rescue inhaler helped when she did use it.   She normally does not have asthma issues when she exercises. Wonders if maybe cold weather had something to do with it.     Review of Systems  Past Medical History:   Diagnosis Date   • Anxiety 10/01/2021    ed visit for anxiety in  and also in 2016; counselors off and on through the years   • Asthma    • Encounter for gynecological examination without abnormal finding 2023     Past Surgical History:   Procedure Laterality Date   • WISDOM TOOTH EXTRACTION       Family History   Problem Relation Age of Onset   • Cancer Mother    • Cervical cancer Mother         hpv   • Obesity Father    • Cancer Sister         eye   • Asthma Maternal Grandmother    • Throat cancer Paternal Uncle    • Colon cancer Neg Hx    • Ovarian cancer Neg Hx      Social History     Tobacco Use   • Smoking status: Former     Current packs/day: 0.00     Types: Cigarettes     Quit date: 2022     Years since quittin.7   • Smokeless tobacco: Former     Types: Chew     Quit date: 2022   • Tobacco comments:     Chewed for 6-7 years   Vaping Use   • Vaping status: Never Used   Substance and Sexual Activity   • Alcohol use: No   • Drug use: No   • Sexual activity: Yes     Partners: Male     Current Outpatient Medications on File Prior to Visit   Medication Sig   • albuterol (PROVENTIL HFA,VENTOLIN HFA) 90 mcg/act inhaler Inhale 2 puffs every 4 (four) hours as needed for wheezing   • [DISCONTINUED] tirzepatide (Zepbound) 5 mg/0.5 mL auto-injector Inject 0.5 mL (5 mg total) under the skin once a week     Allergies   Allergen Reactions   • Pollen Extract Hives     Immunization History   Administered Date(s) Administered   • HPV9 2023   • INFLUENZA 2022   • Influenza Recombinant Preservative Free Im 10/18/2024   • Tdap 2018     Objective   /63   Pulse 77   Wt 110 kg  (243 lb)   SpO2 100%   BMI 38.06 kg/m²     Physical Exam  Vitals and nursing note reviewed.   Constitutional:       General: She is not in acute distress.     Appearance: Normal appearance. She is not ill-appearing, toxic-appearing or diaphoretic.   HENT:      Head: Normocephalic and atraumatic.   Eyes:      Conjunctiva/sclera: Conjunctivae normal.   Cardiovascular:      Rate and Rhythm: Normal rate and regular rhythm.      Heart sounds: No murmur heard.  Pulmonary:      Effort: Pulmonary effort is normal.      Breath sounds: Normal breath sounds. No wheezing, rhonchi or rales.   Abdominal:      General: Abdomen is flat. Bowel sounds are normal.      Palpations: Abdomen is soft.   Musculoskeletal:      Cervical back: Neck supple.      Right lower leg: No edema.      Left lower leg: No edema.   Lymphadenopathy:      Cervical: No cervical adenopathy.   Skin:     General: Skin is warm and dry.      Findings: No rash.   Neurological:      General: No focal deficit present.      Mental Status: She is alert and oriented to person, place, and time.

## 2024-11-18 ENCOUNTER — TELEPHONE (OUTPATIENT)
Age: 29
End: 2024-11-18

## 2024-11-18 DIAGNOSIS — J45.40 MODERATE PERSISTENT ASTHMA WITHOUT COMPLICATION: ICD-10-CM

## 2024-11-18 NOTE — TELEPHONE ENCOUNTER
PA for Budesonide-formoterol (SYMBICORT) 160-4.5 mcg/act inhaler SUBMITTED to [facility]    via    []CMM-KEY:   [x]Surescripts-Case ID #: [id number]   []Availity-Auth ID #  []Faxed to plan   []Other website   []Phone call Case ID #     []PA sent as URGENT    All office notes, labs and other pertaining documents and studies sent. Clinical questions answered. Awaiting determination from insurance company.     Turnaround time for your insurance to make a decision on your Prior Authorization can take 7-21 business days.

## 2024-11-19 RX ORDER — FLUTICASONE PROPIONATE AND SALMETEROL 55; 14 UG/1; UG/1
1 POWDER, METERED RESPIRATORY (INHALATION) 2 TIMES DAILY
Qty: 1 EACH | Refills: 5 | Status: SHIPPED | OUTPATIENT
Start: 2024-11-19

## 2024-11-19 NOTE — TELEPHONE ENCOUNTER
PA for Budesonide-formoterol (SYMBICORT) 160-4.5 mcg/act inhaler DENIED    Reason:(Screenshot if applicable)        Message sent to office clinical pool Yes    Denial letter scanned into Media Yes    Appeal started No (Provider will need to decide if appeal is warranted and send clinical documentation to Prior Authorization Team for initiation.)    **Please follow up with your patient regarding denial and next steps**

## 2024-11-19 NOTE — TELEPHONE ENCOUNTER
"The reason for denial does not make sense to me---\"You did not respond to or could not tolerate a 6 month trial of brand symbicort in the last 365 days\"?? That is exactly what I am ordering.   Who can we discuss this with???  Is there another LABA-corticosteroid that is covered? (Adviar, etc)  "

## 2024-12-11 ENCOUNTER — TELEPHONE (OUTPATIENT)
Dept: FAMILY MEDICINE CLINIC | Facility: CLINIC | Age: 29
End: 2024-12-11

## 2024-12-11 DIAGNOSIS — E66.812 CLASS 2 OBESITY WITHOUT SERIOUS COMORBIDITY IN ADULT, UNSPECIFIED BMI, UNSPECIFIED OBESITY TYPE: ICD-10-CM

## 2024-12-11 DIAGNOSIS — Z79.899 MEDICATION MANAGEMENT: ICD-10-CM

## 2024-12-11 RX ORDER — TIRZEPATIDE 7.5 MG/.5ML
7.5 INJECTION, SOLUTION SUBCUTANEOUS WEEKLY
Qty: 2 ML | Refills: 0 | Status: SHIPPED | OUTPATIENT
Start: 2024-12-11

## 2024-12-11 NOTE — TELEPHONE ENCOUNTER
Patient called to request a refill for their     tirzepatide (Zepbound)       Please call patient if need to discuss [phone]    How are you tolerating the medication?  7.5 mg   [] Nausea  [] Vomiting  [] Diarrhea  [] Asymptomatic  [x] Other: weird off feeling - faster heart rate , panic attack   Never had any issues with a high dose wegovy . She is unsure if is related . Happened 1 time , not every day random experience      Last visit weight: 240    Current weight: 235    Date of last injection:   Dec 5th     How many injections do you have left:  1 pen for 12/12/2024    Would you like an increase in your dose?  [ ] Yes   [ ] No       unsure

## 2024-12-17 ENCOUNTER — OFFICE VISIT (OUTPATIENT)
Dept: FAMILY MEDICINE CLINIC | Facility: CLINIC | Age: 29
End: 2024-12-17

## 2024-12-17 VITALS
WEIGHT: 246.4 LBS | BODY MASS INDEX: 38.59 KG/M2 | HEART RATE: 73 BPM | SYSTOLIC BLOOD PRESSURE: 131 MMHG | DIASTOLIC BLOOD PRESSURE: 60 MMHG | OXYGEN SATURATION: 100 %

## 2024-12-17 DIAGNOSIS — R13.10 DYSPHAGIA, UNSPECIFIED TYPE: ICD-10-CM

## 2024-12-17 DIAGNOSIS — R14.2 BELCHING: Primary | ICD-10-CM

## 2024-12-17 DIAGNOSIS — K21.9 GASTROESOPHAGEAL REFLUX DISEASE, UNSPECIFIED WHETHER ESOPHAGITIS PRESENT: ICD-10-CM

## 2024-12-17 PROCEDURE — 99213 OFFICE O/P EST LOW 20 MIN: CPT | Performed by: FAMILY MEDICINE

## 2024-12-17 RX ORDER — PANTOPRAZOLE SODIUM 40 MG/1
40 TABLET, DELAYED RELEASE ORAL
Qty: 30 TABLET | Refills: 5 | Status: SHIPPED | OUTPATIENT
Start: 2024-12-17 | End: 2025-06-15

## 2024-12-17 NOTE — PROGRESS NOTES
Name: Leny Ndiaye      : 1995      MRN: [id number]  Encounter Provider: Joellen Delacruz DO  Encounter Date: 2024   Encounter department: [facility] PRIMARY CARE    Assessment & Plan  Belching  Suspect that her belching and oropharyngeal dysphagia are all related to reflux related to her zepbound  She will continue to hold this and I gave her pantoprazole 40 mg once daily  Stop all caffeine.   Hold off on any imaging now since she is without insurance.     She should f/u with me in 3-4 weeks when she gets her new insurance and will get upper GI/thyroid US if sx persist.          Dysphagia, unspecified type  See above.        Gastroesophageal reflux disease, unspecified whether esophagitis present  Rx for pantoprazole   Orders:  •  pantoprazole (PROTONIX) 40 mg tablet; Take 1 tablet (40 mg total) by mouth daily before breakfast         History of Present Illness     HPI  Patient is a 29 year old female with asthma and obesity who is being seen today for complaint of belching and difficulty swallowing. Started 2 weeks ago.     Had difficulty swallowing after eating yogurt, protein cereal and blueberries. Sensation that food was stuck. That sensation was more in neck region  than in chest.   Started to panic and heart started racing.     Since then continues to feel like she cannot swallow right. Belching a lot. Voice is hoarse. Only occurs after she eats. Sometimes food comes back up after she belches.     Tried pepcid and this did not help.     She is taking zepbound 7.5 mg for her obesity. Her last dose was 2 weeks ago. She skipped dose last week because of this.   She was previously on even higher dose and tolerated this well    Currently does not have insurance (self pay)      Review of Systems  Past Medical History:   Diagnosis Date   • Anxiety 10/01/2021    ed visit for anxiety in  and also in 2016; counselors off and on through the years   • Asthma    • Encounter for  gynecological examination without abnormal finding 2023     Past Surgical History:   Procedure Laterality Date   • WISDOM TOOTH EXTRACTION       Family History   Problem Relation Age of Onset   • Cancer Mother    • Cervical cancer Mother         hpv   • Obesity Father    • Cancer Sister         eye   • Asthma Maternal Grandmother    • Throat cancer Paternal Uncle    • Colon cancer Neg Hx    • Ovarian cancer Neg Hx      Social History     Tobacco Use   • Smoking status: Former     Current packs/day: 0.00     Types: Cigarettes     Quit date: 2022     Years since quittin.8   • Smokeless tobacco: Former     Types: Chew     Quit date: 2022   • Tobacco comments:     Chewed for 6-7 years   Vaping Use   • Vaping status: Never Used   Substance and Sexual Activity   • Alcohol use: No   • Drug use: No   • Sexual activity: Yes     Partners: Male     Current Outpatient Medications on File Prior to Visit   Medication Sig   • albuterol (PROVENTIL HFA,VENTOLIN HFA) 90 mcg/act inhaler Inhale 2 puffs every 4 (four) hours as needed for wheezing   • fluticasone-salmeterol (AIRDUO RESPICLICK) 55-14 mcg/act dry powder inhaler Inhale 1 puff 2 (two) times a day Rinse mouth after use.   • tirzepatide (Zepbound) 7.5 mg/0.5 mL auto-injector Inject 0.5 mL (7.5 mg total) under the skin once a week (Patient not taking: Reported on 2024)     Allergies   Allergen Reactions   • Pollen Extract Hives     Immunization History   Administered Date(s) Administered   • HPV9 2023   • INFLUENZA 2022   • Influenza Recombinant Preservative Free Im 10/18/2024   • Tdap 2018     Objective   /60   Pulse 73   Wt 112 kg (246 lb 6.4 oz)   SpO2 100%   BMI 38.59 kg/m²     Physical Exam  Vitals and nursing note reviewed.   Constitutional:       General: She is not in acute distress.     Appearance: Normal appearance. She is obese. She is not ill-appearing, toxic-appearing or diaphoretic.   HENT:      Head: Normocephalic  and atraumatic.      Mouth/Throat:      Mouth: Mucous membranes are moist.      Pharynx: No oropharyngeal exudate or posterior oropharyngeal erythema.   Eyes:      Conjunctiva/sclera: Conjunctivae normal.   Neck:      Comments: No thyromegaly  Cardiovascular:      Rate and Rhythm: Normal rate and regular rhythm.      Heart sounds: No murmur heard.  Pulmonary:      Effort: Pulmonary effort is normal.      Breath sounds: Normal breath sounds.   Abdominal:      General: Abdomen is flat. Bowel sounds are normal. There is no distension.      Palpations: Abdomen is soft. There is no mass.      Tenderness: There is no abdominal tenderness.   Musculoskeletal:      Cervical back: Normal range of motion and neck supple.      Right lower leg: No edema.      Left lower leg: No edema.   Lymphadenopathy:      Cervical: No cervical adenopathy.   Neurological:      Mental Status: She is alert.   Psychiatric:         Mood and Affect: Mood normal.

## 2025-01-09 DIAGNOSIS — K21.9 GASTROESOPHAGEAL REFLUX DISEASE, UNSPECIFIED WHETHER ESOPHAGITIS PRESENT: ICD-10-CM

## 2025-01-09 NOTE — PROGRESS NOTES
Name: Leny Ndiaye      : 1995      MRN: [id number]  Encounter Provider: Joellen Delacruz DO  Encounter Date: 1/10/2025   Encounter department: [facility] PRIMARY CARE    Assessment & Plan  Belching  The belching has improved and no longer having the oropharyngeal dysphagia.   On pantoprazole but she is hesitant to continue taking this medication.   She wants to continue zepbound since she seems better with changing injection sites.   Would not recommend increasing to 10 mg dose at this time.   Will message me if sx recur. If they do recur, would have her d/c the GLP1 and see GI in consultation.        Class 2 obesity without serious comorbidity with body mass index (BMI) of 38.0 to 38.9 in adult, unspecified obesity type           Medication management              History of Present Illness     HPI  Patient is a 29 year old female with asthma and obesity who is being seen today for f/u on her belching.   Seen here on  for complaint of belching and difficulty swallowing.     Suspected that her belching and oropharyngeal dysphagia were  related to reflux related to her zepbound (was on 7.5 mg dose at the time of visit).   I recommended that she hold the zepbound and I gave her rx for pantoprazole 40 mg once daily  Advised to stop all caffeine.   Did not order any imaging at time her last visit because she was  without insurance but advised that if sx persisted, we would have to look into this further.     She messaged me soon afterwards noting that he protonix was helping but she was getting migraine headaches on it. Advised to d/c med and change to pepcid. Is back to the protonix. No longer having migraines.     Since last visit, she did decide to resume her zepbound  after 3 weeks at the 7.5 mg dose as sx seemed to improve with PPI. Does admit to occasional belching if she drinks water. She states that she changed injection site to the thigh and feels like this helped.      Previously on wegovy at 2.4 mg dose. She tolerated this but changed to zepbound due to lack of efficacy.     Has made drastic changes to diet since last visit. Hesitant to continue PPI.     Review of Systems  Past Medical History:   Diagnosis Date   • Anxiety 10/01/2021    ed visit for anxiety in  and also in 2016; counselors off and on through the years   • Asthma      Past Surgical History:   Procedure Laterality Date   • WISDOM TOOTH EXTRACTION       Family History   Problem Relation Age of Onset   • Cancer Mother    • Cervical cancer Mother         hpv   • Obesity Father    • Cancer Sister         eye   • Asthma Maternal Grandmother    • Throat cancer Paternal Uncle    • Colon cancer Neg Hx    • Ovarian cancer Neg Hx      Social History     Tobacco Use   • Smoking status: Former     Current packs/day: 0.00     Types: Cigarettes     Quit date: 2022     Years since quittin.9   • Smokeless tobacco: Former     Types: Chew     Quit date: 2022   • Tobacco comments:     Chewed for 6-7 years   Vaping Use   • Vaping status: Never Used   Substance and Sexual Activity   • Alcohol use: No   • Drug use: No   • Sexual activity: Yes     Partners: Male     Current Outpatient Medications on File Prior to Visit   Medication Sig   • albuterol (PROVENTIL HFA,VENTOLIN HFA) 90 mcg/act inhaler Inhale 2 puffs every 4 (four) hours as needed for wheezing   • fluticasone-salmeterol (AIRDUO RESPICLICK) 55-14 mcg/act dry powder inhaler Inhale 1 puff 2 (two) times a day Rinse mouth after use.   • pantoprazole (PROTONIX) 40 mg tablet Take 1 tablet (40 mg total) by mouth daily before breakfast   • tirzepatide (Zepbound) 7.5 mg/0.5 mL auto-injector Inject 0.5 mL (7.5 mg total) under the skin once a week     Allergies   Allergen Reactions   • Pollen Extract Hives     Immunization History   Administered Date(s) Administered   • HPV9 2023   • INFLUENZA 2022   • Influenza Recombinant Preservative Free Im 10/18/2024    • Tdap 09/12/2018     Objective   /60   Pulse 84   Wt 110 kg (242 lb 9.6 oz)   SpO2 98%   BMI 38.00 kg/m²     Physical Exam  Vitals and nursing note reviewed.   Constitutional:       General: She is not in acute distress.     Appearance: Normal appearance. She is obese. She is not ill-appearing, toxic-appearing or diaphoretic.   HENT:      Head: Normocephalic and atraumatic.      Mouth/Throat:      Pharynx: No posterior oropharyngeal erythema.   Eyes:      Conjunctiva/sclera: Conjunctivae normal.   Cardiovascular:      Rate and Rhythm: Normal rate and regular rhythm.      Heart sounds: No murmur heard.  Pulmonary:      Effort: Pulmonary effort is normal.      Breath sounds: Normal breath sounds.   Abdominal:      General: Abdomen is flat. Bowel sounds are normal. There is no distension.      Palpations: Abdomen is soft. There is no mass.      Tenderness: There is no abdominal tenderness. There is no guarding.   Musculoskeletal:      Cervical back: Normal range of motion and neck supple.      Right lower leg: No edema.      Left lower leg: No edema.   Lymphadenopathy:      Cervical: No cervical adenopathy.   Skin:     General: Skin is warm and dry.      Findings: No rash.   Neurological:      General: No focal deficit present.      Mental Status: She is alert and oriented to person, place, and time.   Psychiatric:         Mood and Affect: Mood normal.

## 2025-01-10 ENCOUNTER — OFFICE VISIT (OUTPATIENT)
Dept: FAMILY MEDICINE CLINIC | Facility: CLINIC | Age: 30
End: 2025-01-10
Payer: COMMERCIAL

## 2025-01-10 ENCOUNTER — TELEPHONE (OUTPATIENT)
Dept: FAMILY MEDICINE CLINIC | Facility: CLINIC | Age: 30
End: 2025-01-10

## 2025-01-10 VITALS
OXYGEN SATURATION: 98 % | WEIGHT: 242.6 LBS | BODY MASS INDEX: 38 KG/M2 | SYSTOLIC BLOOD PRESSURE: 125 MMHG | HEART RATE: 84 BPM | DIASTOLIC BLOOD PRESSURE: 60 MMHG

## 2025-01-10 DIAGNOSIS — E66.812 CLASS 2 OBESITY WITHOUT SERIOUS COMORBIDITY WITH BODY MASS INDEX (BMI) OF 38.0 TO 38.9 IN ADULT, UNSPECIFIED OBESITY TYPE: ICD-10-CM

## 2025-01-10 DIAGNOSIS — R14.2 BELCHING: Primary | ICD-10-CM

## 2025-01-10 DIAGNOSIS — E66.812 CLASS 2 OBESITY WITHOUT SERIOUS COMORBIDITY IN ADULT, UNSPECIFIED BMI, UNSPECIFIED OBESITY TYPE: ICD-10-CM

## 2025-01-10 DIAGNOSIS — J45.40 MODERATE PERSISTENT ASTHMA WITHOUT COMPLICATION: ICD-10-CM

## 2025-01-10 DIAGNOSIS — Z79.899 MEDICATION MANAGEMENT: ICD-10-CM

## 2025-01-10 PROCEDURE — 99214 OFFICE O/P EST MOD 30 MIN: CPT | Performed by: FAMILY MEDICINE

## 2025-01-10 RX ORDER — PANTOPRAZOLE SODIUM 40 MG/1
40 TABLET, DELAYED RELEASE ORAL
Qty: 90 TABLET | Refills: 1 | Status: SHIPPED | OUTPATIENT
Start: 2025-01-10

## 2025-01-10 RX ORDER — TIRZEPATIDE 7.5 MG/.5ML
7.5 INJECTION, SOLUTION SUBCUTANEOUS WEEKLY
Qty: 2 ML | Refills: 0 | Status: SHIPPED | OUTPATIENT
Start: 2025-01-10

## 2025-01-10 NOTE — TELEPHONE ENCOUNTER
PA for Zepbound 7.5 mg/0.5mL SUBMITTED to [facility]    via    []CMM-KEY:   [x]Surescripts-Case ID # N/A  []Availity-Auth ID # NDC #   []Faxed to plan   []Other website   []Phone call Case ID #     []PA sent as URGENT    All office notes, labs and other pertaining documents and studies sent. Clinical questions answered. Awaiting determination from insurance company.     Turnaround time for your insurance to make a decision on your Prior Authorization can take 7-21 business days.                  [FreeTextEntry1] : Harmony Dias returns for continued care of the wart on her right heel.  She states that over the weekend she thinks she stepped on something because her right foot is killing her, not where the wart is, but up by the toes.

## 2025-01-10 NOTE — TELEPHONE ENCOUNTER
Please initiate Prior Auth for patient's Zepbound prescription.  Pt has updated insurance information on file.    Update patient as you are able.    Thank you!

## 2025-01-10 NOTE — TELEPHONE ENCOUNTER
Pt made aware of PA notification. Pt spoke to insurance and they are recommending a medical exception form be completed. Please advise if form can be completed.

## 2025-01-10 NOTE — TELEPHONE ENCOUNTER
PA for (Zepbound) 7.5 mg/0.5 mL EXCLUDED from plan       Reason:(Screenshot if applicable)        Message sent to office clinical pool Yes

## 2025-02-06 NOTE — PROGRESS NOTES
Name: Leny Ndiaye      : 1995      MRN: [id number]  Encounter Provider: Joellen Delacruz DO  Encounter Date: 2025   Encounter department: [facility] PRIMARY CARE    Assessment & Plan  Medication management  Doing well on her zepbound 7.5 mg weekly.   Continues to watch diet closely and is running for exercise  She think that when she is due for refill, will want increase dose.        Class 2 obesity without serious comorbidity with body mass index (BMI) of 38.0 to 38.9 in adult, unspecified obesity type                History of Present Illness     HPI  Patient is a 29 year old female with asthma, obesity who is being seen today for f/u on her zepbound.     Is taking the 7.5 mg weekly dose. Tolerating well since changing her injection site to the thigh.     No longer taking the pantoprazole. No reflux or issues with swallowing.     When I saw her in May of 2024, she had been seeing seeing an online provider through a telehealth service called [facility] for her obesity and was being prescribed zepbound 10 mg weekly . Prior to that had been on wegovy at highest dose but did not tolerate due to constipation.   Her weight prior to initiation of zepbound was 311.     She is running for exercise and is also going to [employer].     Appetite is significantly diminished. Eating more salads with chicken, protein bars.             Review of Systems  Past Medical History:   Diagnosis Date    Anxiety 10/01/2021    ed visit for anxiety in  and also in ; counselors off and on through the years    Asthma      Past Surgical History:   Procedure Laterality Date    WISDOM TOOTH EXTRACTION       Family History   Problem Relation Age of Onset    Cancer Mother     Cervical cancer Mother         hpv    Obesity Father     Cancer Sister         eye    Asthma Maternal Grandmother     Throat cancer Paternal Uncle     Colon cancer Neg Hx     Ovarian cancer Neg Hx      Social History     Tobacco  Use    Smoking status: Former     Current packs/day: 0.00     Types: Cigarettes     Quit date: 2/1/2022     Years since quitting: 3.0    Smokeless tobacco: Former     Types: Chew     Quit date: 2/1/2022    Tobacco comments:     Chewed for 6-7 years   Vaping Use    Vaping status: Never Used   Substance and Sexual Activity    Alcohol use: No    Drug use: No    Sexual activity: Yes     Partners: Male     Current Outpatient Medications on File Prior to Visit   Medication Sig    albuterol (PROVENTIL HFA,VENTOLIN HFA) 90 mcg/act inhaler Inhale 2 puffs every 4 (four) hours as needed for wheezing    fluticasone-salmeterol (AIRDUO RESPICLICK) 55-14 mcg/act dry powder inhaler Inhale 1 puff 2 (two) times a day Rinse mouth after use.    pantoprazole (PROTONIX) 40 mg tablet TAKE 1 TABLET BY MOUTH DAILY BEFORE BREAKFAST    tirzepatide (Zepbound) 7.5 mg/0.5 mL auto-injector Inject 0.5 mL (7.5 mg total) under the skin once a week     Allergies   Allergen Reactions    Pollen Extract Hives     Immunization History   Administered Date(s) Administered    HPV9 06/09/2023    INFLUENZA 09/21/2022    Influenza Recombinant Preservative Free Im 10/18/2024    Tdap 09/12/2018     Objective   There were no vitals taken for this visit.    Physical Exam  Vitals and nursing note reviewed.   Constitutional:       General: She is not in acute distress.     Appearance: Normal appearance. She is not ill-appearing, toxic-appearing or diaphoretic.   Eyes:      Conjunctiva/sclera: Conjunctivae normal.   Cardiovascular:      Rate and Rhythm: Normal rate and regular rhythm.      Heart sounds: No murmur heard.  Pulmonary:      Effort: Pulmonary effort is normal.      Breath sounds: Normal breath sounds.   Abdominal:      General: Abdomen is flat. Bowel sounds are normal.      Palpations: Abdomen is soft.      Tenderness: There is no abdominal tenderness.   Musculoskeletal:      Cervical back: Normal range of motion and neck supple.      Right lower leg: No  edema.      Left lower leg: No edema.   Lymphadenopathy:      Cervical: No cervical adenopathy.   Neurological:      Mental Status: She is alert.   Psychiatric:         Mood and Affect: Mood normal.

## 2025-02-07 ENCOUNTER — OFFICE VISIT (OUTPATIENT)
Dept: FAMILY MEDICINE CLINIC | Facility: CLINIC | Age: 30
End: 2025-02-07
Payer: COMMERCIAL

## 2025-02-07 VITALS
OXYGEN SATURATION: 99 % | DIASTOLIC BLOOD PRESSURE: 65 MMHG | BODY MASS INDEX: 36.68 KG/M2 | HEART RATE: 75 BPM | TEMPERATURE: 98.3 F | SYSTOLIC BLOOD PRESSURE: 138 MMHG | WEIGHT: 234.2 LBS

## 2025-02-07 DIAGNOSIS — E66.812 CLASS 2 OBESITY WITHOUT SERIOUS COMORBIDITY WITH BODY MASS INDEX (BMI) OF 38.0 TO 38.9 IN ADULT, UNSPECIFIED OBESITY TYPE: ICD-10-CM

## 2025-02-07 DIAGNOSIS — Z79.899 MEDICATION MANAGEMENT: Primary | ICD-10-CM

## 2025-02-07 PROCEDURE — 99213 OFFICE O/P EST LOW 20 MIN: CPT | Performed by: FAMILY MEDICINE

## 2025-02-11 DIAGNOSIS — Z79.899 MEDICATION MANAGEMENT: ICD-10-CM

## 2025-02-11 DIAGNOSIS — E66.812 CLASS 2 OBESITY WITHOUT SERIOUS COMORBIDITY IN ADULT, UNSPECIFIED BMI, UNSPECIFIED OBESITY TYPE: ICD-10-CM

## 2025-02-12 ENCOUNTER — TELEPHONE (OUTPATIENT)
Dept: FAMILY MEDICINE CLINIC | Facility: CLINIC | Age: 30
End: 2025-02-12

## 2025-02-12 ENCOUNTER — PATIENT MESSAGE (OUTPATIENT)
Dept: FAMILY MEDICINE CLINIC | Facility: CLINIC | Age: 30
End: 2025-02-12

## 2025-02-12 DIAGNOSIS — E66.812 CLASS 2 OBESITY WITHOUT SERIOUS COMORBIDITY WITH BODY MASS INDEX (BMI) OF 38.0 TO 38.9 IN ADULT, UNSPECIFIED OBESITY TYPE: Primary | ICD-10-CM

## 2025-02-12 RX ORDER — TIRZEPATIDE 10 MG/.5ML
10 INJECTION, SOLUTION SUBCUTANEOUS WEEKLY
Qty: 2 ML | Refills: 0 | Status: SHIPPED | OUTPATIENT
Start: 2025-02-12

## 2025-02-12 RX ORDER — TIRZEPATIDE 7.5 MG/.5ML
7.5 INJECTION, SOLUTION SUBCUTANEOUS WEEKLY
Qty: 2 ML | Refills: 0 | Status: SHIPPED | OUTPATIENT
Start: 2025-02-12 | End: 2025-02-12

## 2025-02-12 NOTE — PATIENT COMMUNICATION
Her request for increase dose may have not been made evident at time of refill request.   Let patient know I sent rx for 10 mg just now

## 2025-03-09 DIAGNOSIS — E66.812 CLASS 2 OBESITY WITHOUT SERIOUS COMORBIDITY WITH BODY MASS INDEX (BMI) OF 38.0 TO 38.9 IN ADULT, UNSPECIFIED OBESITY TYPE: ICD-10-CM

## 2025-03-09 RX ORDER — TIRZEPATIDE 10 MG/.5ML
10 INJECTION, SOLUTION SUBCUTANEOUS WEEKLY
Qty: 2 ML | Refills: 0 | Status: CANCELLED | OUTPATIENT
Start: 2025-03-09

## 2025-03-10 RX ORDER — TIRZEPATIDE 12.5 MG/.5ML
12.5 INJECTION, SOLUTION SUBCUTANEOUS WEEKLY
Qty: 2 ML | Refills: 0 | Status: SHIPPED | OUTPATIENT
Start: 2025-03-10

## 2025-04-15 DIAGNOSIS — E66.812 CLASS 2 OBESITY WITHOUT SERIOUS COMORBIDITY WITH BODY MASS INDEX (BMI) OF 38.0 TO 38.9 IN ADULT, UNSPECIFIED OBESITY TYPE: ICD-10-CM

## 2025-04-15 RX ORDER — TIRZEPATIDE 12.5 MG/.5ML
12.5 INJECTION, SOLUTION SUBCUTANEOUS WEEKLY
Qty: 2 ML | Refills: 0 | Status: SHIPPED | OUTPATIENT
Start: 2025-04-15

## 2025-05-28 ENCOUNTER — OFFICE VISIT (OUTPATIENT)
Dept: FAMILY MEDICINE CLINIC | Facility: CLINIC | Age: 30
End: 2025-05-28
Payer: COMMERCIAL

## 2025-05-28 VITALS
WEIGHT: 212.2 LBS | DIASTOLIC BLOOD PRESSURE: 80 MMHG | HEART RATE: 91 BPM | OXYGEN SATURATION: 100 % | SYSTOLIC BLOOD PRESSURE: 124 MMHG | BODY MASS INDEX: 33.24 KG/M2

## 2025-05-28 DIAGNOSIS — Z79.899 MEDICATION MANAGEMENT: Primary | ICD-10-CM

## 2025-05-28 PROCEDURE — 99213 OFFICE O/P EST LOW 20 MIN: CPT | Performed by: FAMILY MEDICINE

## 2025-05-28 RX ORDER — TIRZEPATIDE 15 MG/.5ML
15 INJECTION, SOLUTION SUBCUTANEOUS WEEKLY
Qty: 2 ML | Refills: 0 | Status: SHIPPED | OUTPATIENT
Start: 2025-05-28

## 2025-05-28 RX ORDER — NORGESTIMATE AND ETHINYL ESTRADIOL 0.25-0.035
1 KIT ORAL DAILY
COMMUNITY
Start: 2025-04-17

## 2025-05-28 NOTE — PROGRESS NOTES
Name: Leny Ndiaye      : 1995      MRN: [id number]  Encounter Provider: Joellen Delacruz DO  Encounter Date: 2025   Encounter department: [facility] PRIMARY [facility]ARE    Assessment & Plan  Medication management  Doing well on zepbound and down 32% overall   Increase dose from 12.5 to 15 since tolerated well.   Recheck 3 months  Continue efforts at healthier lifestyle/exercise  Orders:    tirzepatide (Zepbound) 15 mg/0.5 mL auto-injector; Inject 0.5 mL (15 mg total) under the skin once a week    BMI 33.0-33.9,adult    Orders:    tirzepatide (Zepbound) 15 mg/0.5 mL auto-injector; Inject 0.5 mL (15 mg total) under the skin once a week         History of Present Illness     HPIPatient is a 29 year old female with asthma, obesity who is being seen today for f/u on her weight/medication management      When I saw her in May of 2024, she had been seeing seeing an online provider through a telehealth service called [facility] for her obesity and was being prescribed zepbound 10 mg weekly . Prior to that had been on wegovy at highest dose but did not tolerate due to constipation.   Her weight prior to initiation of zepbound was 311    At time of her last visit here on  she was taking 7.5 mg of zebpound and was 234 lbs.   Currently on zepbound 12.5 mg weekly and is down to 212 lbs, a 32 % weight loss overall.     She feels great. No GI issues at all. We discussed increasing dose to 15 mg weekly. She wants to increase to next dose.     She is running for exercise. Planning to do a half marathon in the fall.   Review of Systems  Past Medical History[1]  Past Surgical History[2]  Family History[3]  Social History[4]  Medications[5]  Allergies   Allergen Reactions    Bee Pollen Hives    Pollen Extract Hives     Immunization History   Administered Date(s) Administered    HPV9 2023    INFLUENZA 2022    Influenza Recombinant Preservative Free Im 10/18/2024    Tdap 2018     Objective    /80   Pulse 91   Wt 96.3 kg (212 lb 3.2 oz)   SpO2 100%   BMI 33.24 kg/m²     Physical Exam  Vitals and nursing note reviewed.   Constitutional:       General: She is not in acute distress.     Appearance: Normal appearance. She is not ill-appearing, toxic-appearing or diaphoretic.   HENT:      Head: Normocephalic and atraumatic.     Eyes:      Conjunctiva/sclera: Conjunctivae normal.       Cardiovascular:      Rate and Rhythm: Normal rate and regular rhythm.      Heart sounds: No murmur heard.  Pulmonary:      Effort: Pulmonary effort is normal.      Breath sounds: Normal breath sounds.   Abdominal:      General: There is no distension.      Palpations: Abdomen is soft. There is no mass.      Tenderness: There is no abdominal tenderness.     Musculoskeletal:      Cervical back: Normal range of motion and neck supple.      Right lower leg: No edema.      Left lower leg: No edema.   Lymphadenopathy:      Cervical: No cervical adenopathy.     Skin:     Findings: No rash.     Neurological:      General: No focal deficit present.      Mental Status: She is alert and oriented to person, place, and time.     Psychiatric:         Mood and Affect: Mood normal.                [1]   Past Medical History:  Diagnosis Date    Anxiety 10/01/2021    ed visit for anxiety in 2021 and also in 2016; counselors off and on through the years    Asthma    [2]   Past Surgical History:  Procedure Laterality Date    WISDOM TOOTH EXTRACTION     [3]   Family History  Problem Relation Name Age of Onset    Cancer Mother Leann Ndiaye     Cervical cancer Mother Leann Ndiaye         hpv    Obesity Father      Cancer Sister Neha Mormando         eye    Asthma Maternal Grandmother B. Shruti Fiona     Throat cancer Paternal Uncle      Colon cancer Neg Hx      Ovarian cancer Neg Hx     [4]   Social History  Tobacco Use    Smoking status: Former     Current packs/day: 0.00     Average packs/day: 0.1 packs/day for 10.5 years (1.1 ttl pk-yrs)      Types: Cigarettes     Start date: 8/1/2011     Quit date: 2/1/2022     Years since quitting: 3.3     Passive exposure: Past    Smokeless tobacco: Former     Types: Chew     Quit date: 2/1/2022    Tobacco comments:     Chewed for 6-7 years   Vaping Use    Vaping status: Never Used   Substance and Sexual Activity    Alcohol use: No    Drug use: No    Sexual activity: Yes     Partners: Male   [5]   Current Outpatient Medications on File Prior to Visit   Medication Sig    albuterol (PROVENTIL HFA,VENTOLIN HFA) 90 mcg/act inhaler Inhale 2 puffs every 4 (four) hours as needed for wheezing    fluticasone-salmeterol (AIRDUO RESPICLICK) 55-14 mcg/act dry powder inhaler Inhale 1 puff 2 (two) times a day Rinse mouth after use.    Sakina 0.25-35 MG-MCG per tablet Take 1 tablet by mouth in the morning    tirzepatide (Zepbound) 12.5 mg/0.5 mL auto-injector Inject 0.5 mL (12.5 mg total) under the skin once a week

## 2025-06-05 ENCOUNTER — OFFICE VISIT (OUTPATIENT)
Dept: OBGYN CLINIC | Facility: CLINIC | Age: 30
End: 2025-06-05
Payer: MEDICARE

## 2025-06-05 VITALS
BODY MASS INDEX: 32.33 KG/M2 | WEIGHT: 206 LBS | HEIGHT: 67 IN | SYSTOLIC BLOOD PRESSURE: 120 MMHG | DIASTOLIC BLOOD PRESSURE: 76 MMHG

## 2025-06-05 DIAGNOSIS — Z30.09 BIRTH CONTROL COUNSELING: Primary | ICD-10-CM

## 2025-06-05 PROCEDURE — 99213 OFFICE O/P EST LOW 20 MIN: CPT | Performed by: PHYSICIAN ASSISTANT

## 2025-06-05 NOTE — PROGRESS NOTES
[facility] [address], [address], [place] [address]    ASSESSMENT/PLAN:     1. Birth control counseling  Assessment & Plan:  Reviewed longer term options for birth control including NuvaRing, Nexplanon, Depo Provera, Vianey/Kyleena/Mirena IUD, ParaGard IUD. Patient most interested in Mirena IUD. Reviewed risks of insertion including perforation, migration that can lead to scarring, surgery and issues with infertility. Reviewed expulsion risk, risk of ectopic pregnancy as well as pelvic inflammatory disease. Reviewed common bleeding patterns and side effects. Task sent to staff to check insurance for coverage and schedule patient for insertion at end of menses.         CC:  Discuss IUD, does have issues with bad cramping and clotting since having daughter, currently taking zepbound was suggested to use other BC, has questions on pros and cons of IUD and difference between non hormonal and hormone IUD     HPI: Leny Ndiaye is a 29 y.o.  who presents for birth control discussion.   Given script for Sakina OCP. Currently on Zepbound and concerned about decreased effectiveness. Had Termination 2025 for unplanned pregnancy on Zepbound.   Got pregnant on NuvaRing previously.       ROS: Negative except as noted in HPI    Patient's last menstrual period was 2025 (approximate).       She  reports being sexually active and has had partner(s) who are male.       The following portions of the patient's history were reviewed and updated as appropriate:   Past Medical History[1]  Past Surgical History[2]  Family History[3]  Social History[4]  Outpatient Medications Marked as Taking for the 25 encounter (Office Visit) with Yajaira Girard PA-C   Medication    albuterol (PROVENTIL HFA,VENTOLIN HFA) 90 mcg/act inhaler    fluticasone-salmeterol (AIRDUO RESPICLICK) 55-14 mcg/act dry powder inhaler    Sakina 0.25-35 MG-MCG per tablet    tirzepatide (Zepbound) 15 mg/0.5 mL auto-injector  "    Allergies[5]        Objective:  /76 (BP Location: Left arm, Patient Position: Sitting, Cuff Size: Large)   Ht 5' 7\" (1.702 m)   Wt 93.4 kg (206 lb)   LMP 06/01/2025 (Approximate)   BMI 32.26 kg/m²            Physical Exam  Constitutional:       Appearance: She is well-developed.   HENT:      Head: Normocephalic and atraumatic.   Neck:      Thyroid: No thyromegaly.     Cardiovascular:      Rate and Rhythm: Normal rate and regular rhythm.      Heart sounds: Normal heart sounds. No murmur heard.     No friction rub. No gallop.   Pulmonary:      Effort: Pulmonary effort is normal. No respiratory distress.      Breath sounds: Normal breath sounds. No wheezing.   Abdominal:      General: There is no distension.      Palpations: Abdomen is soft. There is no mass.      Tenderness: There is no abdominal tenderness. There is no guarding or rebound.      Hernia: No hernia is present.   Lymphadenopathy:      Cervical: No cervical adenopathy.     Neurological:      Mental Status: She is alert and oriented to person, place, and time.     Skin:     General: Skin is warm and dry.     Psychiatric:         Behavior: Behavior normal.             Yajaira Girard PA-C  6/10/2025 6:07 PM         [1]   Past Medical History:  Diagnosis Date    Anxiety 10/01/2021    ed visit for anxiety in 2021 and also in 2016; counselors off and on through the years    Asthma    [2]   Past Surgical History:  Procedure Laterality Date    WISDOM TOOTH EXTRACTION     [3]   Family History  Problem Relation Name Age of Onset    Cancer Mother Leann Ndiaye     Cervical cancer Mother Leann Ndiaye         hpv    Obesity Father      Cancer Sister Neha Mormando         eye    Asthma Maternal Grandmother B. Shruti Fiona     Throat cancer Paternal Uncle      Colon cancer Neg Hx      Ovarian cancer Neg Hx     [4]   Social History  Socioeconomic History    Marital status: Single   Tobacco Use    Smoking status: Former     Current packs/day: 0.00     " Average packs/day: 0.1 packs/day for 10.5 years (1.1 ttl pk-yrs)     Types: Cigarettes     Start date: 8/1/2011     Quit date: 2/1/2022     Years since quitting: 3.3     Passive exposure: Past    Smokeless tobacco: Former     Types: Chew     Quit date: 2/1/2022    Tobacco comments:     Chewed for 6-7 years   Vaping Use    Vaping status: Never Used   Substance and Sexual Activity    Alcohol use: No    Drug use: No    Sexual activity: Yes     Partners: Male   Social History Narrative    Single    One daughter who is 4     She works as  for [employer] in [place]   [5]   Allergies  Allergen Reactions    Bee Pollen Hives    Pollen Extract Hives

## 2025-06-09 ENCOUNTER — TELEPHONE (OUTPATIENT)
Age: 30
End: 2025-06-09

## 2025-06-09 NOTE — TELEPHONE ENCOUNTER
Reason for call:   [x] Prior Auth  [] Other:     Caller:  [x] Patient  [] Pharmacy  Name:   Address:   Callback Number:     Medication:     tirzepatide (Zepbound) 15 mg/0.5 mL auto-injector       Dose/Frequency:     Inject 0.5 mL (15 mg total) under the skin once a week       Quantity: 2mL    Ordering Provider:   [x] PCP/Provider -   [] Speciality/Provider -     Has the patient tried other medications and failed? If failed, which medications did they fail?    [] No   [] Yes -     Is the patient's insurance updated in EPIC?   [x] Yes   [] No     Is a copy of the patient's insurance scanned in EPIC?   [x] Yes   [] No

## 2025-06-09 NOTE — TELEPHONE ENCOUNTER
PA for (Zepbound) 15 mg/0.5 mL auto-injector SUBMITTED to [employer]    via    []CMM-KEY:   [x]Surescripts-Case ID # [id number]   []Availity-Auth ID # NDC #   []Faxed to plan   []Other website   []Phone call Case ID #     [x]PA sent as URGENT    All office notes, labs and other pertaining documents and studies sent. Clinical questions answered. Awaiting determination from insurance company.     Turnaround time for your insurance to make a decision on your Prior Authorization can take 7-21 business days.

## 2025-06-10 PROBLEM — Z30.09 BIRTH CONTROL COUNSELING: Status: ACTIVE | Noted: 2025-06-10

## 2025-06-10 NOTE — ASSESSMENT & PLAN NOTE
Reviewed longer term options for birth control including NuvaRing, Nexplanon, Depo Provera, Vianey/Kyleena/Mirena IUD, ParaGard IUD. Patient most interested in Mirena IUD. Reviewed risks of insertion including perforation, migration that can lead to scarring, surgery and issues with infertility. Reviewed expulsion risk, risk of ectopic pregnancy as well as pelvic inflammatory disease. Reviewed common bleeding patterns and side effects. Task sent to staff to check insurance for coverage and schedule patient for insertion at end of menses.

## 2025-06-10 NOTE — TELEPHONE ENCOUNTER
PA for (Zepbound) 15 mg/0.5 mL auto-injector APPROVED     Date(s) approved 06/09/25-12/09/25    Case # [id number]    Patient advised by          [][employer] Message  [x]Phone call   [x]LMOM  []L/M to call office as no active Communication consent on file  []Unable to leave detailed message as VM not approved on Communication consent       Pharmacy advised by    [x]Fax  []Phone call  []Secure Chat       Approval letter scanned into Media Yes

## 2025-06-27 ENCOUNTER — TELEPHONE (OUTPATIENT)
Dept: OBGYN CLINIC | Facility: CLINIC | Age: 30
End: 2025-06-27

## 2025-07-02 DIAGNOSIS — Z79.899 MEDICATION MANAGEMENT: ICD-10-CM

## 2025-07-03 RX ORDER — TIRZEPATIDE 15 MG/.5ML
15 INJECTION, SOLUTION SUBCUTANEOUS WEEKLY
Qty: 2 ML | Refills: 0 | Status: SHIPPED | OUTPATIENT
Start: 2025-07-03

## 2025-08-02 DIAGNOSIS — Z79.899 MEDICATION MANAGEMENT: ICD-10-CM

## 2025-08-04 RX ORDER — TIRZEPATIDE 15 MG/.5ML
15 INJECTION, SOLUTION SUBCUTANEOUS WEEKLY
Qty: 2 ML | Refills: 0 | Status: SHIPPED | OUTPATIENT
Start: 2025-08-04

## 2025-08-06 ENCOUNTER — TELEPHONE (OUTPATIENT)
Age: 30
End: 2025-08-06

## 2025-08-07 ENCOUNTER — TELEPHONE (OUTPATIENT)
Age: 30
End: 2025-08-07

## 2025-08-11 ENCOUNTER — TELEPHONE (OUTPATIENT)
Dept: OBGYN CLINIC | Facility: CLINIC | Age: 30
End: 2025-08-11